# Patient Record
Sex: OTHER/UNKNOWN | Race: WHITE | Employment: FULL TIME | URBAN - METROPOLITAN AREA
[De-identification: names, ages, dates, MRNs, and addresses within clinical notes are randomized per-mention and may not be internally consistent; named-entity substitution may affect disease eponyms.]

---

## 2024-04-02 ENCOUNTER — OFFICE VISIT (OUTPATIENT)
Dept: PRIMARY CARE | Facility: CLINIC | Age: 49
End: 2024-04-02
Payer: COMMERCIAL

## 2024-04-02 VITALS
BODY MASS INDEX: 35.16 KG/M2 | DIASTOLIC BLOOD PRESSURE: 90 MMHG | HEIGHT: 68 IN | WEIGHT: 232 LBS | TEMPERATURE: 96.8 F | OXYGEN SATURATION: 98 % | HEART RATE: 67 BPM | SYSTOLIC BLOOD PRESSURE: 142 MMHG

## 2024-04-02 DIAGNOSIS — R12 HEARTBURN: Primary | ICD-10-CM

## 2024-04-02 DIAGNOSIS — R00.2 PALPITATIONS: ICD-10-CM

## 2024-04-02 PROCEDURE — 1036F TOBACCO NON-USER: CPT | Performed by: FAMILY MEDICINE

## 2024-04-02 PROCEDURE — 99203 OFFICE O/P NEW LOW 30 MIN: CPT | Performed by: FAMILY MEDICINE

## 2024-04-02 RX ORDER — PANTOPRAZOLE SODIUM 20 MG/1
TABLET, DELAYED RELEASE ORAL
COMMUNITY
Start: 2024-03-22 | End: 2024-04-02 | Stop reason: SDUPTHER

## 2024-04-02 RX ORDER — PANTOPRAZOLE SODIUM 40 MG/1
40 TABLET, DELAYED RELEASE ORAL
Qty: 30 TABLET | Refills: 0 | Status: SHIPPED | OUTPATIENT
Start: 2024-04-02 | End: 2024-05-20 | Stop reason: SDUPTHER

## 2024-04-02 NOTE — PROGRESS NOTES
Subjective   Patient ID: Lul Lizama is a 48 y.o. adult who presents for Follow-up and New Patient Visit.    Assessment/Plan     Problem List Items Addressed This Visit       Heartburn - Primary    Relevant Medications    pantoprazole (ProtoNix) 40 mg EC tablet    Palpitations    Relevant Orders    TSH with reflex to Free T4 if abnormal    Comprehensive Metabolic Panel       HPI    48-year-old male new patient to me    Paroxysmal A-fib- just one episode - otherwise PVCs  WILLIAM s/p inspire device placement in 2020 0- did not work - on cpap  Hyperlipidemia  Obesity BMI 35    Was Following with sleep medicine and cardiology  Was On aspirin and metoprolol    No smoking - quit 10 yrs ago - smoked 0.5  ppd for 10 yrs    Recent ER visit couple of weeks ago with palpitation  Patient had an EKG done showed PVCs      Minimally elevated LFT and creatinine 1.4 will repeat CMP in couple of weeks  Chest x-ray within normal limits  Patient also complaining of heartburn burping was prescribed pantoprazole  Will increase the dose and advised patient to take daily  Avoid alcohol  Patient does not smoke  No over-the-counter anti-inflammatories no excessive caffeine    If symptoms return will consider right upper quadrant ultrasound and possibly GI consult for EGD  Patient agrees    Patient states he had a heart monitor few years ago for a month and found to have only PVCs just 1 episode of A-fib    Patient will be seen by cardiology will await for recommendation  Currently asymptomatic    No Known Allergies    Current Outpatient Medications   Medication Sig Dispense Refill    pantoprazole (ProtoNix) 40 mg EC tablet Take 1 tablet (40 mg) by mouth once daily in the morning. Take before meals. Do not crush, chew, or split. 30 tablet 0     No current facility-administered medications for this visit.       Objective   Visit Vitals  /90 (BP Location: Left arm, Patient Position: Sitting)   Pulse 67   Temp 36 °C (96.8 °F)   Ht 1.727 m  "(5' 8\")   Wt 105 kg (232 lb)   SpO2 98%   BMI 35.28 kg/m²   Smoking Status Former   BSA 2.24 m²     Physical Exam  Constitutional:       General: He is not in acute distress.     Appearance: Normal appearance.   HENT:      Head: Normocephalic and atraumatic.      Nose: Nose normal.   Eyes:      Extraocular Movements: Extraocular movements intact.      Conjunctiva/sclera: Conjunctivae normal.   Cardiovascular:      Rate and Rhythm: Normal rate and regular rhythm.   Pulmonary:      Effort: Pulmonary effort is normal.      Breath sounds: Normal breath sounds.   Skin:     General: Skin is warm.   Neurological:      Mental Status: He is alert and oriented to person, place, and time.   Psychiatric:         Mood and Affect: Mood normal.         Behavior: Behavior normal.     No carotid or thyroid bruit or thyromegaly    There is no immunization history on file for this patient.    Review of Systems    No visits with results within 4 Month(s) from this visit.   Latest known visit with results is:   No results found for any previous visit.       Radiology: Reviewed imaging in powerchart.  No results found.    No family history on file.  Social History     Socioeconomic History    Marital status:      Spouse name: None    Number of children: None    Years of education: None    Highest education level: None   Occupational History    None   Tobacco Use    Smoking status: Former     Packs/day: 0.50     Years: 10.00     Additional pack years: 0.00     Total pack years: 5.00     Types: Cigarettes    Smokeless tobacco: Former     Types: Chew   Substance and Sexual Activity    Alcohol use: Yes     Alcohol/week: 2.0 standard drinks of alcohol     Types: 2 Standard drinks or equivalent per week    Drug use: Never    Sexual activity: None   Other Topics Concern    None   Social History Narrative    None     Social Determinants of Health     Financial Resource Strain: Not on file   Food Insecurity: Not on file   Transportation " Needs: Not on file   Physical Activity: Not on file   Stress: Not on file   Social Connections: Not on file   Intimate Partner Violence: Not on file   Housing Stability: Not on file     History reviewed. No pertinent past medical history.  History reviewed. No pertinent surgical history.    Charting was completed using voice recognition technology and may include unintended errors.

## 2024-05-17 ENCOUNTER — TELEPHONE (OUTPATIENT)
Dept: PRIMARY CARE | Facility: CLINIC | Age: 49
End: 2024-05-17
Payer: COMMERCIAL

## 2024-05-17 DIAGNOSIS — R12 HEARTBURN: ICD-10-CM

## 2024-05-20 RX ORDER — PANTOPRAZOLE SODIUM 40 MG/1
40 TABLET, DELAYED RELEASE ORAL
Qty: 30 TABLET | Refills: 0 | Status: SHIPPED | OUTPATIENT
Start: 2024-05-20

## 2024-10-11 ENCOUNTER — HOSPITAL ENCOUNTER (INPATIENT)
Facility: HOSPITAL | Age: 49
DRG: 093 | End: 2024-10-11
Attending: THORACIC SURGERY (CARDIOTHORACIC VASCULAR SURGERY) | Admitting: THORACIC SURGERY (CARDIOTHORACIC VASCULAR SURGERY)
Payer: COMMERCIAL

## 2024-10-11 DIAGNOSIS — G89.18 POST-OP PAIN: ICD-10-CM

## 2024-10-11 DIAGNOSIS — L08.9 SOFT TISSUE INFECTION: Primary | ICD-10-CM

## 2024-10-11 DIAGNOSIS — J02.9 THROAT INFECTION: ICD-10-CM

## 2024-10-11 LAB
APTT PPP: 32 SECONDS (ref 27–38)
ERYTHROCYTE [DISTWIDTH] IN BLOOD BY AUTOMATED COUNT: 12.4 % (ref 11.5–14.5)
HCT VFR BLD AUTO: 44.9 % (ref 36–52)
HGB BLD-MCNC: 15.3 G/DL (ref 12–17.5)
INR PPP: 1 (ref 0.9–1.1)
MCH RBC QN AUTO: 29.6 PG (ref 26–34)
MCHC RBC AUTO-ENTMCNC: 34.1 G/DL (ref 32–36)
MCV RBC AUTO: 87 FL (ref 80–100)
NRBC BLD-RTO: 0 /100 WBCS (ref 0–0)
PLATELET # BLD AUTO: 165 X10*3/UL (ref 150–450)
PROTHROMBIN TIME: 11.4 SECONDS (ref 9.8–12.8)
RBC # BLD AUTO: 5.17 X10*6/UL (ref 4–5.9)
WBC # BLD AUTO: 5 X10*3/UL (ref 4.4–11.3)

## 2024-10-11 PROCEDURE — 85610 PROTHROMBIN TIME: CPT

## 2024-10-11 PROCEDURE — 80048 BASIC METABOLIC PNL TOTAL CA: CPT

## 2024-10-11 PROCEDURE — 83735 ASSAY OF MAGNESIUM: CPT

## 2024-10-11 PROCEDURE — 85027 COMPLETE CBC AUTOMATED: CPT

## 2024-10-11 PROCEDURE — 36415 COLL VENOUS BLD VENIPUNCTURE: CPT

## 2024-10-11 PROCEDURE — 86901 BLOOD TYPING SEROLOGIC RH(D): CPT

## 2024-10-11 PROCEDURE — 2500000004 HC RX 250 GENERAL PHARMACY W/ HCPCS (ALT 636 FOR OP/ED)

## 2024-10-11 PROCEDURE — 1200000002 HC GENERAL ROOM WITH TELEMETRY DAILY

## 2024-10-11 PROCEDURE — 2500000001 HC RX 250 WO HCPCS SELF ADMINISTERED DRUGS (ALT 637 FOR MEDICARE OP)

## 2024-10-11 PROCEDURE — 99223 1ST HOSP IP/OBS HIGH 75: CPT | Performed by: THORACIC SURGERY (CARDIOTHORACIC VASCULAR SURGERY)

## 2024-10-11 RX ORDER — NALOXONE HYDROCHLORIDE 0.4 MG/ML
0.2 INJECTION, SOLUTION INTRAMUSCULAR; INTRAVENOUS; SUBCUTANEOUS EVERY 5 MIN PRN
Status: DISCONTINUED | OUTPATIENT
Start: 2024-10-11 | End: 2024-10-17 | Stop reason: HOSPADM

## 2024-10-11 RX ORDER — ACETAMINOPHEN 325 MG/1
650 TABLET ORAL EVERY 6 HOURS
Status: DISCONTINUED | OUTPATIENT
Start: 2024-10-11 | End: 2024-10-17 | Stop reason: HOSPADM

## 2024-10-11 RX ORDER — OXYCODONE HYDROCHLORIDE 5 MG/1
10 TABLET ORAL EVERY 4 HOURS PRN
Status: DISCONTINUED | OUTPATIENT
Start: 2024-10-11 | End: 2024-10-17 | Stop reason: HOSPADM

## 2024-10-11 RX ORDER — VANCOMYCIN HYDROCHLORIDE 1 G/200ML
1000 INJECTION, SOLUTION INTRAVENOUS ONCE
Status: COMPLETED | OUTPATIENT
Start: 2024-10-11 | End: 2024-10-12

## 2024-10-11 RX ORDER — VANCOMYCIN HYDROCHLORIDE 500 MG/100ML
500 INJECTION, SOLUTION INTRAVENOUS ONCE
Status: COMPLETED | OUTPATIENT
Start: 2024-10-12 | End: 2024-10-12

## 2024-10-11 RX ORDER — VANCOMYCIN HYDROCHLORIDE 1 G/20ML
INJECTION, POWDER, LYOPHILIZED, FOR SOLUTION INTRAVENOUS DAILY PRN
Status: DISCONTINUED | OUTPATIENT
Start: 2024-10-11 | End: 2024-10-17 | Stop reason: HOSPADM

## 2024-10-11 RX ORDER — ONDANSETRON HYDROCHLORIDE 2 MG/ML
4 INJECTION, SOLUTION INTRAVENOUS EVERY 8 HOURS PRN
Status: DISCONTINUED | OUTPATIENT
Start: 2024-10-11 | End: 2024-10-11

## 2024-10-11 RX ORDER — OXYCODONE HYDROCHLORIDE 5 MG/1
5 TABLET ORAL EVERY 6 HOURS PRN
Status: DISCONTINUED | OUTPATIENT
Start: 2024-10-11 | End: 2024-10-13

## 2024-10-11 ASSESSMENT — PAIN DESCRIPTION - ORIENTATION: ORIENTATION: RIGHT

## 2024-10-11 ASSESSMENT — PAIN DESCRIPTION - LOCATION: LOCATION: CHEST

## 2024-10-11 ASSESSMENT — PAIN - FUNCTIONAL ASSESSMENT
PAIN_FUNCTIONAL_ASSESSMENT: 0-10
PAIN_FUNCTIONAL_ASSESSMENT: 0-10

## 2024-10-11 ASSESSMENT — PAIN SCALES - GENERAL
PAINLEVEL_OUTOF10: 3
PAINLEVEL_OUTOF10: 7

## 2024-10-11 ASSESSMENT — PAIN DESCRIPTION - DESCRIPTORS: DESCRIPTORS: ACHING;STABBING

## 2024-10-11 NOTE — H&P
History Of Present Illness  Lul Lizama is a 49 y.o. adult with a history of WILLIAM presenting as a transfer for management of possible infection of his hypoglossal nerve stimulator.     Patient initially had stimulator placed in 2020 at [facility] for management of WILLIAM. He says it has not worked for him as effectively as CPAP over the last 2 years. Has never had issues with it until yesterday, when he noticed redness and marked pain at the insertion site in the R chest wall. He denies any associated dysphagia, N/V, fever, chills, constipation, diarrhea. Has never had a wound like this before. Denies smoking.    All systems reviewed and otherwise negative.     Past Medical History  HLD, obesity, WILLIAM, pAF (not on medications)    Surgical History  Insertion of inspire upper airway neurostimulator for sleep apnea (3/2020)     Social History  Lul reports that Lul has quit smoking. Lul's smoking use included cigarettes. Lul has a 5 pack-year smoking history. Lul has quit using smokeless tobacco.  Lul's smokeless tobacco use included chew. Lul reports current alcohol use of about 2.0 standard drinks of alcohol per week. Lul reports that Lul does not use drugs.    Family History  No family history on file.     Allergies  Patient has no known allergies.    General: NAD, resting comfortably  HEENT: NCAT  Resp: nonlabored breathing on RA  CV: RRR  Abd: soft, NTND  MSK: moves all extremities  Ext: no LE edema  Skin: blanching erythema, edema, and cellulitis over stimulator site on R chest wall - exquisitely TTP  Psych: appropriate mood and behavior     Last Recorded Vitals  Blood pressure (!) 190/110, pulse 66, temperature 36.4 °C (97.5 °F), temperature source Temporal, resp. rate 20, weight 98.1 kg (216 lb 3.2 oz), SpO2 99%.    Relevant Results  Labs and imaging pending.     Assessment/Plan   50 y/o M with history of WILLIAM presenting as a transfer for management of possible infection of his hypoglossal nerve  stimulator.     Plan:  - ENT consult  - Ok for CLD per ENT  - CT neck and chest with IV contrast   - Follow up labs  - Empiric vanc/zosyn    To be discussed with attending, Dr. Momin.    Cailtyn Sosa MD  Thoracic Surgery [id number]

## 2024-10-12 ENCOUNTER — APPOINTMENT (OUTPATIENT)
Dept: RADIOLOGY | Facility: HOSPITAL | Age: 49
End: 2024-10-12
Payer: COMMERCIAL

## 2024-10-12 LAB
ABO GROUP (TYPE) IN BLOOD: NORMAL
ANION GAP SERPL CALC-SCNC: 13 MMOL/L (ref 10–20)
ANTIBODY SCREEN: NORMAL
BUN SERPL-MCNC: 29 MG/DL (ref 6–23)
CALCIUM SERPL-MCNC: 9.2 MG/DL (ref 8.6–10.6)
CHLORIDE SERPL-SCNC: 104 MMOL/L (ref 98–107)
CO2 SERPL-SCNC: 26 MMOL/L (ref 21–32)
CREAT SERPL-MCNC: 1.3 MG/DL (ref 0.5–1.3)
EGFRCR SERPLBLD CKD-EPI 2021: 51 ML/MIN/1.73M*2
GLUCOSE SERPL-MCNC: 120 MG/DL (ref 74–99)
MAGNESIUM SERPL-MCNC: 2.11 MG/DL (ref 1.6–2.4)
POTASSIUM SERPL-SCNC: 3.7 MMOL/L (ref 3.5–5.3)
RH FACTOR (ANTIGEN D): NORMAL
SODIUM SERPL-SCNC: 139 MMOL/L (ref 136–145)

## 2024-10-12 PROCEDURE — 99222 1ST HOSP IP/OBS MODERATE 55: CPT | Performed by: OTOLARYNGOLOGY

## 2024-10-12 PROCEDURE — 2500000001 HC RX 250 WO HCPCS SELF ADMINISTERED DRUGS (ALT 637 FOR MEDICARE OP)

## 2024-10-12 PROCEDURE — 99233 SBSQ HOSP IP/OBS HIGH 50: CPT | Performed by: THORACIC SURGERY (CARDIOTHORACIC VASCULAR SURGERY)

## 2024-10-12 PROCEDURE — 2500000004 HC RX 250 GENERAL PHARMACY W/ HCPCS (ALT 636 FOR OP/ED)

## 2024-10-12 PROCEDURE — 70491 CT SOFT TISSUE NECK W/DYE: CPT | Performed by: RADIOLOGY

## 2024-10-12 PROCEDURE — 71260 CT THORAX DX C+: CPT

## 2024-10-12 PROCEDURE — 70491 CT SOFT TISSUE NECK W/DYE: CPT

## 2024-10-12 PROCEDURE — 1200000003 HC ONCOLOGY  ROOM WITH TELEMETRY DAILY

## 2024-10-12 PROCEDURE — 2550000001 HC RX 255 CONTRASTS: Performed by: THORACIC SURGERY (CARDIOTHORACIC VASCULAR SURGERY)

## 2024-10-12 PROCEDURE — 71260 CT THORAX DX C+: CPT | Performed by: STUDENT IN AN ORGANIZED HEALTH CARE EDUCATION/TRAINING PROGRAM

## 2024-10-12 RX ORDER — ACETAMINOPHEN 500 MG
1 TABLET ORAL DAILY
COMMUNITY

## 2024-10-12 RX ORDER — VANCOMYCIN HYDROCHLORIDE 1 G/200ML
1000 INJECTION, SOLUTION INTRAVENOUS EVERY 12 HOURS
Status: DISCONTINUED | OUTPATIENT
Start: 2024-10-12 | End: 2024-10-12

## 2024-10-12 ASSESSMENT — PAIN - FUNCTIONAL ASSESSMENT
PAIN_FUNCTIONAL_ASSESSMENT: 0-10
PAIN_FUNCTIONAL_ASSESSMENT: WONG-BAKER FACES

## 2024-10-12 ASSESSMENT — PAIN SCALES - GENERAL
PAINLEVEL_OUTOF10: 7
PAINLEVEL_OUTOF10: 3
PAINLEVEL_OUTOF10: 3
PAINLEVEL_OUTOF10: 8
PAINLEVEL_OUTOF10: 7
PAINLEVEL_OUTOF10: 0 - NO PAIN
PAINLEVEL_OUTOF10: 7

## 2024-10-12 ASSESSMENT — PAIN DESCRIPTION - ORIENTATION: ORIENTATION: RIGHT

## 2024-10-12 ASSESSMENT — PAIN DESCRIPTION - LOCATION: LOCATION: CHEST

## 2024-10-12 ASSESSMENT — PAIN DESCRIPTION - DESCRIPTORS
DESCRIPTORS: ACHING
DESCRIPTORS: SORE;STABBING

## 2024-10-12 NOTE — CONSULTS
"Vancomycin Dosing by Pharmacy- INITIAL    Lul Lizama is a 49 y.o. year old adult who Pharmacy has been consulted for vancomycin dosing for cellulitis, skin and soft tissue. Based on the patient's indication and renal status this patient will be dosed based on a goal AUC of 400-600    Renal function is stable    Visit Vitals  BP (!) 190/110 (BP Location: Left arm, Patient Position: Lying)   Pulse 66   Temp 36.4 °C (97.5 °F) (Temporal)   Resp 20        No results found for: \"CREATININE\"     Patient weight is as follows:   Vitals:    10/11/24 2152   Weight: 98.1 kg (216 lb 3.2 oz)       Cultures:  No results found for the encounter in last 14 days.        No intake/output data recorded.  I/O during current shift:  No intake/output data recorded.    Temp (24hrs), Av.4 °C (97.5 °F), Min:36.4 °C (97.5 °F), Max:36.4 °C (97.5 °F)         Assessment/Plan     Patient will not be given a loading dose.  Will initiate vancomycin 1500 mg x1 dose and will order maintenance dose pending results of renal labs  Scheduled 1g q12H starting at 1200    insightRX Pharmacokinetic parameters:  Regimen: 1000 mg IV every 12 hours.  Start time: 12:00 on 10/12/2024  Exposure target: AUC24 (range)400-600 mg/L.hr   ELH86-33: 462 mg/L.hr  AUC24,ss: 521 mg/L.hr  Probability of AUC24 > 400: 77 %  Ctrough,ss: 17.3 mg/L  Probability of Ctrough,ss > 20: 37 %      Follow-up level will be ordered on 1013 AM labs unless clinically indicated sooner.  Will continue to monitor renal function daily while on vancomycin and order serum creatinine at least every 48 hours if not already ordered.  Follow for continued vancomycin needs, clinical response, and signs/symptoms of toxicity.       Devin Santo, PharmD       "

## 2024-10-12 NOTE — H&P (VIEW-ONLY)
ENT DEPARTMENT CONSULTATION NOTE  Name: Lul Lizama  MRN: [id number]  : 1975  Consulting Team: Thoracic surgery, Dr. Ayon   Reason for Consult: Infection of inspire implant site    History of Present Illness  The patient is a 49 y.o. adult who presented to [facility] on 10/11/2024 for infection of his hypoglossal nerve stimulator chest site.  Patient notes that he received the implant in  by Dr. Duval but has not really been using it as it never worked.  Patient denies any symptoms from the area until about 2 months ago when he started noticing that it was protruding a little bit more.  Over the past day, the patient noted that it has gotten red and acutely painful especially palpation.  Patient denies any fevers chills nausea vomiting abdominal pain chest pain shortness of breath or any other constitutional symptoms.  Patient went to [facility] And was subsequently transferred to [facility] for further evaluation.  Patient was accepted to the thoracic surgery service and ENT was consulted for likely postsurgical infection.        Review of Systems  14 point review of systems completed and all negative except as noted in HPI.    Past Medical History  No past medical history on file.    Past Surgical History  No past surgical history on file.    Allergies  No Known Allergies    Medications    Current Facility-Administered Medications:     acetaminophen (Tylenol) tablet 650 mg, 650 mg, oral, q6h, Caitlyn Sosa MD, 650 mg at 10/12/24 0805    naloxone (Narcan) injection 0.2 mg, 0.2 mg, intravenous, q5 min PRN, Caitlyn Sosa MD    oxyCODONE (Roxicodone) immediate release tablet 10 mg, 10 mg, oral, q4h PRN, Caitlyn Sosa MD, 10 mg at 10/12/24 0806    oxyCODONE (Roxicodone) immediate release tablet 5 mg, 5 mg, oral, q6h PRN, Caitlyn Sosa MD    piperacillin-tazobactam (Zosyn) 3.375 g in dextrose (iso) IV 50 mL, 3.375 g, intravenous, q6h, Caitlyn Sosa MD, Stopped at 10/12/24 0600    vancomycin (Vancocin) 1,000 mg  in dextrose 5%  mL, 1,000 mg, intravenous, q12h, Caitlyn Sosa MD    vancomycin (Vancocin) pharmacy to dose - pharmacy monitoring, , miscellaneous, Daily PRN, Caitlyn Sosa MD    Family History  No family history on file.    Social History  Social History     Socioeconomic History    Marital status:      Spouse name: Not on file    Number of children: Not on file    Years of education: Not on file    Highest education level: Not on file   Occupational History    Not on file   Tobacco Use    Smoking status: Former     Current packs/day: 0.50     Average packs/day: 0.5 packs/day for 10.0 years (5.0 ttl pk-yrs)     Types: Cigarettes    Smokeless tobacco: Former     Types: Chew   Substance and Sexual Activity    Alcohol use: Yes     Alcohol/week: 2.0 standard drinks of alcohol     Types: 2 Standard drinks or equivalent per week    Drug use: Never    Sexual activity: Not on file   Other Topics Concern    Not on file   Social History Narrative    Not on file     Social Determinants of Health     Financial Resource Strain: Not on file   Food Insecurity: Not on file   Transportation Needs: Not on file   Physical Activity: Not on file   Stress: Not on file   Social Connections: Not on file   Intimate Partner Violence: Not on file   Housing Stability: Not on file       Vital Signs  Vitals:    10/12/24 0500   BP: 132/76   Pulse: (!) 49   Resp: 16   Temp: 36.7 °C (98.1 °F)   SpO2: 97%       Physical Examination  GEN: The patient appears stated age in no acute distress  VOICE: No dysphonia, volume is appropriate, no pitch/voice breaks   RESP: Unlabored on room air with no audible stertor or stridor  CV: Clinically well perfused   EYES: EOM grossly intact with no scleral icterus  NEURO: Alert and oriented with no focal deficits and CN II-XII symmetric and intact bilaterally  HEAD: Scalp is normocephalic and atraumatic  FACE: No abrasions or lacerations, no maxillary or mandibular stepoffs  EARS: Normal external  ears  NOSE: External nose appears normal  OC: Normal lips, normal buccal mucosa, normal alveolar ridge, normal floor of mouth, normal tongue, normal hard palate, normal retromolar trigone  OP: normal pharyngeal walls, normal soft palate  NECK: Trachea midline, no significant lymphadenopathy  Chest: Well healed scar over right chest, area approximately 3x4cm erythematous with surrounding induration and soft central area but without any evidence of fluctuance.  Site is exquisitely tender to palpation.  No evidence of wound breakdown.    Laboratory and Data  Results for orders placed or performed during the hospital encounter of 10/11/24 (from the past 24 hour(s))   CBC   Result Value Ref Range    WBC 5.0 4.4 - 11.3 x10*3/uL    nRBC 0.0 0.0 - 0.0 /100 WBCs    RBC 5.17 4.00 - 5.90 x10*6/uL    Hemoglobin 15.3 12.0 - 17.5 g/dL    Hematocrit 44.9 36.0 - 52.0 %    MCV 87 80 - 100 fL    MCH 29.6 26.0 - 34.0 pg    MCHC 34.1 32.0 - 36.0 g/dL    RDW 12.4 11.5 - 14.5 %    Platelets 165 150 - 450 x10*3/uL   Coagulation Screen   Result Value Ref Range    Protime 11.4 9.8 - 12.8 seconds    INR 1.0 0.9 - 1.1    aPTT 32 27 - 38 seconds   Basic metabolic panel   Result Value Ref Range    Glucose 120 (H) 74 - 99 mg/dL    Sodium 139 136 - 145 mmol/L    Potassium 3.7 3.5 - 5.3 mmol/L    Chloride 104 98 - 107 mmol/L    Bicarbonate 26 21 - 32 mmol/L    Anion Gap 13 10 - 20 mmol/L    Urea Nitrogen 29 (H) 6 - 23 mg/dL    Creatinine 1.30 0.50 - 1.30 mg/dL    eGFR 51 (L) >60 mL/min/1.73m*2    Calcium 9.2 8.6 - 10.6 mg/dL   Magnesium   Result Value Ref Range    Magnesium 2.11 1.60 - 2.40 mg/dL   Type And Screen   Result Value Ref Range    ABO TYPE O     Rh TYPE POS     ANTIBODY SCREEN NEG        Radiology Reviewed  I have personally reviewed the CT neck and chest. No clear fluid collection, lots of scatter.      Assessment  Lul Lizama is a 49 y.o. adult who underwent hypoglossal nerve stimulation placement 2020 now presenting with acute  erythema and pain of his right chest incision and implant site.  Patient has no constitutional symptoms.  CT imaging demonstrates no clear fluid collection. On exam, no obvious abscess or well defined collection of fluid at this time. Patient has not had antibiotics until the last 24 hours.    Recommendations  -Patient may eat, no indication for procedure at this time.   -Initiate broad-spectrum antibiotics - recommend at least 48 hours of abx before we would reassess any intervention plans unless erythema extends or patient clinical condition worsens  -IF the patient does develop any concern for abscess formation would consider diagnostic needle I&D at bedside prior to any further intervention    Papito Cottrell MD - PGY2  Otolaryngology - Head & Neck Surgery  [facility]    I am the night resident. I can only be contacted from 5 PM to 6 AM. Page on weekends or off hours.   ENT Consult pager: [phone]  ENT Peds pager: [phone]  ENT Head & Neck Surgery Phone: [phone]  ENT subspecialty team: Loc individual resident who wrote today's note  ENT Outpatient scheduling number: [phone]  Please Page [phone] or call [phone] if Urgent      _______________________________________________________________________________________  STAFFING UPDATE:    Seen by Dr. Landon. Minimal erythema, induration, no denisse abscess appreciated on physical exam. Rec 48 hrs of abx. Will continue to observe, if worsening will consider further discussion with sleep surgery attendings. Okay for transfer to ENT service under Dr. Landon.    Attending attestation: I, Rubén Landon MD FACS, reviewed the note and performed a complete history and physical on the patient and examined the patient at the bedside. I agree with the note as outlined with changes made by myself directly to this note.

## 2024-10-12 NOTE — PROGRESS NOTES
"Pharmacy Medication History Review    Lul Lizama is a 49 y.o. adult admitted for Soft tissue infection. Pharmacy reviewed the patient's [id number] medications and allergies for accuracy.    Medications ADDED:  Vitamin D3 125mg  Medications CHANGED:  none  Medications REMOVED:   none     The list below reflects the updated PTA list.   Prior to Admission Medications   Prescriptions Last Dose Informant   cholecalciferol (Vitamin D3) 5,000 Units tablet  Self   Sig: Take 1 tablet (5,000 Units) by mouth once daily.   pantoprazole (ProtoNix) 40 mg EC tablet More than a month Self   Sig: Take 1 tablet (40 mg) by mouth once daily in the morning. Take before meals. Do not crush, chew, or split.   Patient not taking: Reported on 10/12/2024      Facility-Administered Medications: None        The list below reflects the updated allergy list. Please review each documented allergy for additional clarification and justification.  Allergies  Reviewed by Caitlyn Sosa MD on 10/11/2024   No Known Allergies         Patient declines M2B at discharge.     Sources:   Good historian. Patient interviewed at bedside, confirmed medication history  Review of out patient fill history, OARRS     Additional Comments:  none      Kayden Payne, PharmD  Transitions of Care Pharmacist  10/12/24     Secure Chat preferred   If no response call [phone] or [place] \"Med Rec\"    "

## 2024-10-12 NOTE — CONSULTS
ENT DEPARTMENT CONSULTATION NOTE  Name: Lul Lizama  MRN: [id number]  : 1975  Consulting Team: Thoracic surgery, Dr. Ayon   Reason for Consult: Infection of inspire implant site    History of Present Illness  The patient is a 49 y.o. adult who presented to [facility] on 10/11/2024 for infection of his hypoglossal nerve stimulator chest site.  Patient notes that he received the implant in  by Dr. Duval but has not really been using it as it never worked.  Patient denies any symptoms from the area until about 2 months ago when he started noticing that it was protruding a little bit more.  Over the past day, the patient noted that it has gotten red and acutely painful especially palpation.  Patient denies any fevers chills nausea vomiting abdominal pain chest pain shortness of breath or any other constitutional symptoms.  Patient went to [facility] And was subsequently transferred to [facility] for further evaluation.  Patient was accepted to the thoracic surgery service and ENT was consulted for likely postsurgical infection.        Review of Systems  14 point review of systems completed and all negative except as noted in HPI.    Past Medical History  No past medical history on file.    Past Surgical History  No past surgical history on file.    Allergies  No Known Allergies    Medications    Current Facility-Administered Medications:     acetaminophen (Tylenol) tablet 650 mg, 650 mg, oral, q6h, Caitlyn Sosa MD, 650 mg at 10/12/24 0805    naloxone (Narcan) injection 0.2 mg, 0.2 mg, intravenous, q5 min PRN, Caitlyn Sosa MD    oxyCODONE (Roxicodone) immediate release tablet 10 mg, 10 mg, oral, q4h PRN, Caitlyn Sosa MD, 10 mg at 10/12/24 0806    oxyCODONE (Roxicodone) immediate release tablet 5 mg, 5 mg, oral, q6h PRN, Caitlyn Sosa MD    piperacillin-tazobactam (Zosyn) 3.375 g in dextrose (iso) IV 50 mL, 3.375 g, intravenous, q6h, Caitlyn Sosa MD, Stopped at 10/12/24 0600    vancomycin (Vancocin) 1,000 mg  in dextrose 5%  mL, 1,000 mg, intravenous, q12h, Caitlyn Sosa MD    vancomycin (Vancocin) pharmacy to dose - pharmacy monitoring, , miscellaneous, Daily PRN, Caitlyn Sosa MD    Family History  No family history on file.    Social History  Social History     Socioeconomic History    Marital status:      Spouse name: Not on file    Number of children: Not on file    Years of education: Not on file    Highest education level: Not on file   Occupational History    Not on file   Tobacco Use    Smoking status: Former     Current packs/day: 0.50     Average packs/day: 0.5 packs/day for 10.0 years (5.0 ttl pk-yrs)     Types: Cigarettes    Smokeless tobacco: Former     Types: Chew   Substance and Sexual Activity    Alcohol use: Yes     Alcohol/week: 2.0 standard drinks of alcohol     Types: 2 Standard drinks or equivalent per week    Drug use: Never    Sexual activity: Not on file   Other Topics Concern    Not on file   Social History Narrative    Not on file     Social Determinants of Health     Financial Resource Strain: Not on file   Food Insecurity: Not on file   Transportation Needs: Not on file   Physical Activity: Not on file   Stress: Not on file   Social Connections: Not on file   Intimate Partner Violence: Not on file   Housing Stability: Not on file       Vital Signs  Vitals:    10/12/24 0500   BP: 132/76   Pulse: (!) 49   Resp: 16   Temp: 36.7 °C (98.1 °F)   SpO2: 97%       Physical Examination  GEN: The patient appears stated age in no acute distress  VOICE: No dysphonia, volume is appropriate, no pitch/voice breaks   RESP: Unlabored on room air with no audible stertor or stridor  CV: Clinically well perfused   EYES: EOM grossly intact with no scleral icterus  NEURO: Alert and oriented with no focal deficits and CN II-XII symmetric and intact bilaterally  HEAD: Scalp is normocephalic and atraumatic  FACE: No abrasions or lacerations, no maxillary or mandibular stepoffs  EARS: Normal external  ears  NOSE: External nose appears normal  OC: Normal lips, normal buccal mucosa, normal alveolar ridge, normal floor of mouth, normal tongue, normal hard palate, normal retromolar trigone  OP: normal pharyngeal walls, normal soft palate  NECK: Trachea midline, no significant lymphadenopathy  Chest: Well healed scar over right chest, area approximately 3x4cm erythematous with surrounding induration and soft central area but without any evidence of fluctuance.  Site is exquisitely tender to palpation.  No evidence of wound breakdown.    Laboratory and Data  Results for orders placed or performed during the hospital encounter of 10/11/24 (from the past 24 hour(s))   CBC   Result Value Ref Range    WBC 5.0 4.4 - 11.3 x10*3/uL    nRBC 0.0 0.0 - 0.0 /100 WBCs    RBC 5.17 4.00 - 5.90 x10*6/uL    Hemoglobin 15.3 12.0 - 17.5 g/dL    Hematocrit 44.9 36.0 - 52.0 %    MCV 87 80 - 100 fL    MCH 29.6 26.0 - 34.0 pg    MCHC 34.1 32.0 - 36.0 g/dL    RDW 12.4 11.5 - 14.5 %    Platelets 165 150 - 450 x10*3/uL   Coagulation Screen   Result Value Ref Range    Protime 11.4 9.8 - 12.8 seconds    INR 1.0 0.9 - 1.1    aPTT 32 27 - 38 seconds   Basic metabolic panel   Result Value Ref Range    Glucose 120 (H) 74 - 99 mg/dL    Sodium 139 136 - 145 mmol/L    Potassium 3.7 3.5 - 5.3 mmol/L    Chloride 104 98 - 107 mmol/L    Bicarbonate 26 21 - 32 mmol/L    Anion Gap 13 10 - 20 mmol/L    Urea Nitrogen 29 (H) 6 - 23 mg/dL    Creatinine 1.30 0.50 - 1.30 mg/dL    eGFR 51 (L) >60 mL/min/1.73m*2    Calcium 9.2 8.6 - 10.6 mg/dL   Magnesium   Result Value Ref Range    Magnesium 2.11 1.60 - 2.40 mg/dL   Type And Screen   Result Value Ref Range    ABO TYPE O     Rh TYPE POS     ANTIBODY SCREEN NEG        Radiology Reviewed  I have personally reviewed the CT neck and chest. No clear fluid collection, lots of scatter.      Assessment  Lul Lizama is a 49 y.o. adult who underwent hypoglossal nerve stimulation placement 2020 now presenting with acute  erythema and pain of his right chest incision and implant site.  Patient has no constitutional symptoms.  CT imaging demonstrates no clear fluid collection. On exam, no obvious abscess or well defined collection of fluid at this time. Patient has not had antibiotics until the last 24 hours.    Recommendations  -Patient may eat, no indication for procedure at this time.   -Initiate broad-spectrum antibiotics - recommend at least 48 hours of abx before we would reassess any intervention plans unless erythema extends or patient clinical condition worsens  -IF the patient does develop any concern for abscess formation would consider diagnostic needle I&D at bedside prior to any further intervention    Papito Cottrell MD - PGY2  Otolaryngology - Head & Neck Surgery  [facility]    I am the night resident. I can only be contacted from 5 PM to 6 AM. Page on weekends or off hours.   ENT Consult pager: [phone]  ENT Peds pager: [phone]  ENT Head & Neck Surgery Phone: [phone]  ENT subspecialty team: Loc individual resident who wrote today's note  ENT Outpatient scheduling number: [phone]  Please Page [phone] or call [phone] if Urgent      _______________________________________________________________________________________  STAFFING UPDATE:    Seen by Dr. Landon. Minimal erythema, induration, no denisse abscess appreciated on physical exam. Rec 48 hrs of abx. Will continue to observe, if worsening will consider further discussion with sleep surgery attendings. Okay for transfer to ENT service under Dr. Landon.    Attending attestation: I, Rubén Landon MD FACS, reviewed the note and performed a complete history and physical on the patient and examined the patient at the bedside. I agree with the note as outlined with changes made by myself directly to this note.

## 2024-10-12 NOTE — PROGRESS NOTES
Lul Lizama is a 49 y.o. adult on day 1 of admission presenting with Soft tissue infection.    Subjective   No acute events overnight. Patient tolerating clears. Pain is controlled.     CT chest and neck done overnight showing cellulitis and a questionable collection surrounding the battery of the hypoglossal nerve stimulator.     Objective     Physical Exam  General: NAD, resting comfortably  HEENT: NCAT  Resp: nonlabored breathing on RA  CV: RRR  Abd: soft, NTND  MSK: moves all extremities  Ext: no LE edema  Skin: blanching erythema, edema, and cellulitis over stimulator site on R chest wall - exquisitely TTP  Psych: appropriate mood and behavior  Last Recorded Vitals  Blood pressure 132/76, pulse (!) 49, temperature 36.7 °C (98.1 °F), temperature source Temporal, resp. rate 16, weight 98.1 kg (216 lb 3.2 oz), SpO2 97%.  Intake/Output last 3 Shifts:  I/O last 3 completed shifts:  In: 650 (6.6 mL/kg) [P.O.:250; IV Piggyback:400]  Out: - (0 mL/kg)   Weight: 98.1 kg     Relevant Results               CT soft tissue neck w IV contrast    Result Date: 10/12/2024  STUDY: CT SOFT TISSUE NECK W IV CONTRAST;  10/12/2024 3:13 am   INDICATION: Signs/Symptoms:concern for infected hypoglossal nerve stimulator, needs contrast.   COMPARISON: None.   ACCESSION NUMBER(S): [id number]   ORDERING CLINICIAN: VJ THORNE   TECHNIQUE: Axial CT images of the neck were obtained after the intravenous administration of 75 mL Omnipaque 350 contrast. Coronal and sagittal reformatted images were reconstructed from the axial data.   FINDINGS: Oral Cavity, Pharynx and Larynx:  Evaluation of the oral cavity is limited due to beam hardening artifact by dental hardware, however otherwise the oral cavity is unremarkable. The nasopharyngeal and oropharyngeal structures are unremarkable. The hypopharyngeal and laryngeal structures are unremarkable.   Retropharyngeal space and soft tissues: No retropharyngeal soft tissue thickening.    There is a partially visualized battery pack of the right chest wall with an associated lead for a right hypoglossal nerve stimulator. Ill-defined soft tissue thickening, skin thickening, and questionable collection overlying the battery pack. No additional inflammatory change or collection along the course of the lead.   Lymph nodes: No cervical lymphadenopathy.   Neck vessels: The vasculature of the neck is of normal course and caliber without significant calcified atherosclerosis or stenosis.   Thyroid gland: The thyroid gland is unremarkable in size and appearance.   Parotid and submandibular glands: Bilateral parotid and submandibular glands are unremarkable in appearance.   Paranasal Sinuses and Mastoids: Visualized paranasal sinuses and bilateral mastoids are clear.   Visualized orbital structures are unremarkable.   Visualized upper lungs are clear.   Visualized cervical spine appears unremarkable.       1. Right hypoglossal nerve stimulator with a partially visualized battery pack in the right chest wall. Soft tissue and skin thickening overlying the battery pack with questionable associated fluid collection. No additional inflammatory change or collection along the course of the lead. 2. No additional acute abnormality of the soft tissues of the neck.     I personally reviewed the image(s)/study and resident interpretation as stated by Dr. Kay Peters MD. I agree with the findings as stated. This study was interpreted at [facility], [place], [place].   MACRO: None     Dictation workstation:   [id number]    CT chest w IV contrast    Result Date: 10/12/2024  STUDY: CT CHEST W IV CONTRAST;  10/12/2024 3:13 am   INDICATION: Signs/Symptoms:concern for hypoglossal nerve stimulator infection. .   COMPARISON: None.   ACCESSION NUMBER(S): [id number]   ORDERING CLINICIAN: VJ THORNE   TECHNIQUE: Helical data acquisition of the chest was obtained. Images were  reformatted in axial, coronal, and sagittal planes.   A total of 75 mL of Omnipaque 350 was administered prior to this exam without immediate complication.   FINDINGS: LUNGS AND AIRWAYS: The trachea and central airways are patent. No endobronchial lesion.   No airspace opacity, effusion, or pneumothorax. Mild dependent atelectasis.   MEDIASTINUM AND LUCINDA, LOWER NECK AND AXILLA: The visualized thyroid gland is within normal limits.   No evidence of thoracic lymphadenopathy by CT criteria.   Esophagus appears within normal limits as seen.   HEART AND VESSELS: The thoracic aorta is of normal course and caliber without vascular calcifications.   Main pulmonary artery and its branches are normal in caliber.   No coronary artery calcifications are seen. The study is not optimized for evaluation of coronary arteries.   The cardiac chambers are not enlarged.   No evidence of pericardial effusion.   UPPER ABDOMEN: Partially visualized parapelvic cysts within the left kidney. No additional significant abnormality of the subdiaphragmatic structures.   CHEST WALL AND OSSEOUS STRUCTURES: Right chest wall stimulator battery pack in place, partially included within the field of view. There is focal soft tissue swelling and skin thickening overlying the battery pack with a questionable ill-defined associated collection, however evaluation is limited due to extensive beam hardening artifact. No additional soft tissue abnormality of the chest wall. Osseous structures are unremarkable.       1. Soft tissue swelling and skin thickening overlying a nerve stimulator battery pack of the right chest wall suggestive of cellulitis. Questionable ill-defined collection overlying the battery pack, however evaluation is limited due to beam hardening artifact. 2. No additional intrathoracic abnormality.   I personally reviewed the image(s)/study and resident interpretation as stated by Dr. Kay Peters MD. I agree with the findings as  stated. This study was interpreted at [facility], [place], [place].   MACRO: None     Dictation workstation:   [id number]     Results for orders placed or performed during the hospital encounter of 10/11/24 (from the past 24 hour(s))   CBC   Result Value Ref Range    WBC 5.0 4.4 - 11.3 x10*3/uL    nRBC 0.0 0.0 - 0.0 /100 WBCs    RBC 5.17 4.00 - 5.90 x10*6/uL    Hemoglobin 15.3 12.0 - 17.5 g/dL    Hematocrit 44.9 36.0 - 52.0 %    MCV 87 80 - 100 fL    MCH 29.6 26.0 - 34.0 pg    MCHC 34.1 32.0 - 36.0 g/dL    RDW 12.4 11.5 - 14.5 %    Platelets 165 150 - 450 x10*3/uL   Coagulation Screen   Result Value Ref Range    Protime 11.4 9.8 - 12.8 seconds    INR 1.0 0.9 - 1.1    aPTT 32 27 - 38 seconds   Basic metabolic panel   Result Value Ref Range    Glucose 120 (H) 74 - 99 mg/dL    Sodium 139 136 - 145 mmol/L    Potassium 3.7 3.5 - 5.3 mmol/L    Chloride 104 98 - 107 mmol/L    Bicarbonate 26 21 - 32 mmol/L    Anion Gap 13 10 - 20 mmol/L    Urea Nitrogen 29 (H) 6 - 23 mg/dL    Creatinine 1.30 0.50 - 1.30 mg/dL    eGFR 51 (L) >60 mL/min/1.73m*2    Calcium 9.2 8.6 - 10.6 mg/dL   Magnesium   Result Value Ref Range    Magnesium 2.11 1.60 - 2.40 mg/dL   Type And Screen   Result Value Ref Range    ABO TYPE O     Rh TYPE POS     ANTIBODY SCREEN NEG                     Assessment/Plan   Assessment & Plan  Soft tissue infection    50 y/o M with history of WILLIAM presenting as a transfer for management of possible infection of his hypoglossal nerve stimulator. CT scan of chest and neck showing  cellulitis and a questionable collection surrounding the battery of the hypoglossal nerve stimulator.      Plan:  - Pending transfer to ENT service   - Ok for CLD per ENT  - Empiric vanc/zosyn  - Thoracic surgery signing off      Seen and Discussed with attending, Dr. Momin.    Thoracic Surgery [id number]        Rain Williamson DO

## 2024-10-13 VITALS
OXYGEN SATURATION: 96 % | TEMPERATURE: 97.2 F | SYSTOLIC BLOOD PRESSURE: 136 MMHG | HEART RATE: 64 BPM | WEIGHT: 216.2 LBS | DIASTOLIC BLOOD PRESSURE: 80 MMHG | RESPIRATION RATE: 16 BRPM | BODY MASS INDEX: 32.87 KG/M2

## 2024-10-13 LAB
GRAM STN SPEC: ABNORMAL
GRAM STN SPEC: ABNORMAL
VANCOMYCIN SERPL-MCNC: 25.9 UG/ML (ref 5–20)

## 2024-10-13 PROCEDURE — 2500000004 HC RX 250 GENERAL PHARMACY W/ HCPCS (ALT 636 FOR OP/ED)

## 2024-10-13 PROCEDURE — 99232 SBSQ HOSP IP/OBS MODERATE 35: CPT | Performed by: OTOLARYNGOLOGY

## 2024-10-13 PROCEDURE — 2500000004 HC RX 250 GENERAL PHARMACY W/ HCPCS (ALT 636 FOR OP/ED): Performed by: STUDENT IN AN ORGANIZED HEALTH CARE EDUCATION/TRAINING PROGRAM

## 2024-10-13 PROCEDURE — 36415 COLL VENOUS BLD VENIPUNCTURE: CPT

## 2024-10-13 PROCEDURE — 80202 ASSAY OF VANCOMYCIN: CPT

## 2024-10-13 PROCEDURE — 87070 CULTURE OTHR SPECIMN AEROBIC: CPT | Performed by: STUDENT IN AN ORGANIZED HEALTH CARE EDUCATION/TRAINING PROGRAM

## 2024-10-13 PROCEDURE — 0W983ZZ DRAINAGE OF CHEST WALL, PERCUTANEOUS APPROACH: ICD-10-PCS | Performed by: OTOLARYNGOLOGY

## 2024-10-13 PROCEDURE — 1200000003 HC ONCOLOGY  ROOM WITH TELEMETRY DAILY

## 2024-10-13 PROCEDURE — 2500000004 HC RX 250 GENERAL PHARMACY W/ HCPCS (ALT 636 FOR OP/ED): Performed by: OTOLARYNGOLOGY

## 2024-10-13 PROCEDURE — 2500000001 HC RX 250 WO HCPCS SELF ADMINISTERED DRUGS (ALT 637 FOR MEDICARE OP)

## 2024-10-13 RX ORDER — HYDROMORPHONE HYDROCHLORIDE 1 MG/ML
0.2 INJECTION, SOLUTION INTRAMUSCULAR; INTRAVENOUS; SUBCUTANEOUS EVERY 4 HOURS PRN
Status: DISCONTINUED | OUTPATIENT
Start: 2024-10-13 | End: 2024-10-17 | Stop reason: HOSPADM

## 2024-10-13 RX ORDER — OXYCODONE HYDROCHLORIDE 5 MG/1
5 TABLET ORAL EVERY 4 HOURS PRN
Status: DISCONTINUED | OUTPATIENT
Start: 2024-10-13 | End: 2024-10-17 | Stop reason: HOSPADM

## 2024-10-13 SDOH — SOCIAL STABILITY: SOCIAL INSECURITY
WITHIN THE LAST YEAR, HAVE TO BEEN RAPED OR FORCED TO HAVE ANY KIND OF SEXUAL ACTIVITY BY YOUR PARTNER OR EX-PARTNER?: NO

## 2024-10-13 SDOH — SOCIAL STABILITY: SOCIAL INSECURITY
WITHIN THE LAST YEAR, HAVE YOU BEEN RAPED OR FORCED TO HAVE ANY KIND OF SEXUAL ACTIVITY BY YOUR PARTNER OR EX-PARTNER?: NO

## 2024-10-13 SDOH — SOCIAL STABILITY: SOCIAL INSECURITY
WITHIN THE LAST YEAR, HAVE YOU BEEN KICKED, HIT, SLAPPED, OR OTHERWISE PHYSICALLY HURT BY YOUR PARTNER OR EX-PARTNER?: NO

## 2024-10-13 SDOH — SOCIAL STABILITY: SOCIAL INSECURITY: DOES ANYONE TRY TO KEEP YOU FROM HAVING/CONTACTING OTHER FRIENDS OR DOING THINGS OUTSIDE YOUR HOME?: NO

## 2024-10-13 SDOH — ECONOMIC STABILITY: FOOD INSECURITY: WITHIN THE PAST 12 MONTHS, YOU WORRIED THAT YOUR FOOD WOULD RUN OUT BEFORE YOU GOT MONEY TO BUY MORE.: NEVER TRUE

## 2024-10-13 SDOH — SOCIAL STABILITY: SOCIAL INSECURITY: WERE YOU ABLE TO COMPLETE ALL THE BEHAVIORAL HEALTH SCREENINGS?: YES

## 2024-10-13 SDOH — ECONOMIC STABILITY: FOOD INSECURITY: WITHIN THE PAST 12 MONTHS, THE FOOD YOU BOUGHT JUST DIDN'T LAST AND YOU DIDN'T HAVE MONEY TO GET MORE.: NEVER TRUE

## 2024-10-13 SDOH — SOCIAL STABILITY: SOCIAL INSECURITY: DO YOU FEEL UNSAFE GOING BACK TO THE PLACE WHERE YOU ARE LIVING?: NO

## 2024-10-13 SDOH — SOCIAL STABILITY: SOCIAL INSECURITY: ARE YOU OR HAVE YOU BEEN THREATENED OR ABUSED PHYSICALLY, EMOTIONALLY, OR SEXUALLY BY ANYONE?: NO

## 2024-10-13 SDOH — SOCIAL STABILITY: SOCIAL INSECURITY: WITHIN THE LAST YEAR, HAVE YOU BEEN HUMILIATED OR EMOTIONALLY ABUSED IN OTHER WAYS BY YOUR PARTNER OR EX-PARTNER?: NO

## 2024-10-13 SDOH — SOCIAL STABILITY: SOCIAL INSECURITY: WITHIN THE LAST YEAR, HAVE YOU BEEN AFRAID OF YOUR PARTNER OR EX-PARTNER?: NO

## 2024-10-13 SDOH — ECONOMIC STABILITY: FOOD INSECURITY: WITHIN THE PAST 12 MONTHS, YOU WORRIED THAT YOUR FOOD WOULD RUN OUT BEFORE YOU GOT THE MONEY TO BUY MORE.: NEVER TRUE

## 2024-10-13 SDOH — ECONOMIC STABILITY: INCOME INSECURITY: IN THE PAST 12 MONTHS HAS THE ELECTRIC, GAS, OIL, OR WATER COMPANY THREATENED TO SHUT OFF SERVICES IN YOUR HOME?: NO

## 2024-10-13 SDOH — ECONOMIC STABILITY: INCOME INSECURITY: IN THE PAST 12 MONTHS, HAS THE ELECTRIC, GAS, OIL, OR WATER COMPANY THREATENED TO SHUT OFF SERVICE IN YOUR HOME?: NO

## 2024-10-13 SDOH — SOCIAL STABILITY: SOCIAL INSECURITY: HAS ANYONE EVER THREATENED TO HURT YOUR FAMILY OR YOUR PETS?: NO

## 2024-10-13 SDOH — SOCIAL STABILITY: SOCIAL INSECURITY: HAVE YOU HAD THOUGHTS OF HARMING ANYONE ELSE?: NO

## 2024-10-13 SDOH — SOCIAL STABILITY: SOCIAL INSECURITY: DO YOU FEEL ANYONE HAS EXPLOITED OR TAKEN ADVANTAGE OF YOU FINANCIALLY OR OF YOUR PERSONAL PROPERTY?: NO

## 2024-10-13 SDOH — SOCIAL STABILITY: SOCIAL INSECURITY: ARE THERE ANY APPARENT SIGNS OF INJURIES/BEHAVIORS THAT COULD BE RELATED TO ABUSE/NEGLECT?: NO

## 2024-10-13 SDOH — SOCIAL STABILITY: SOCIAL INSECURITY: ABUSE: ADULT

## 2024-10-13 ASSESSMENT — LIFESTYLE VARIABLES
AUDIT-C TOTAL SCORE: 0
HOW MANY STANDARD DRINKS CONTAINING ALCOHOL DO YOU HAVE ON A TYPICAL DAY: PATIENT DOES NOT DRINK
AUDIT-C TOTAL SCORE: 0
SKIP TO QUESTIONS 9-10: 1
HOW OFTEN DO YOU HAVE A DRINK CONTAINING ALCOHOL: NEVER
HOW OFTEN DO YOU HAVE 6 OR MORE DRINKS ON ONE OCCASION: NEVER

## 2024-10-13 ASSESSMENT — PAIN - FUNCTIONAL ASSESSMENT
PAIN_FUNCTIONAL_ASSESSMENT: 0-10
PAIN_FUNCTIONAL_ASSESSMENT: 0-10
PAIN_FUNCTIONAL_ASSESSMENT: WONG-BAKER FACES

## 2024-10-13 ASSESSMENT — COGNITIVE AND FUNCTIONAL STATUS - GENERAL
MOBILITY SCORE: 24
DAILY ACTIVITIY SCORE: 24
PATIENT BASELINE BEDBOUND: NO

## 2024-10-13 ASSESSMENT — PAIN SCALES - GENERAL
PAINLEVEL_OUTOF10: 0 - NO PAIN
PAINLEVEL_OUTOF10: 8
PAINLEVEL_OUTOF10: 8
PAINLEVEL_OUTOF10: 0 - NO PAIN
PAINLEVEL_OUTOF10: 7
PAINLEVEL_OUTOF10: 9
PAINLEVEL_OUTOF10: 0 - NO PAIN

## 2024-10-13 ASSESSMENT — ACTIVITIES OF DAILY LIVING (ADL)
HEARING - LEFT EAR: FUNCTIONAL
HEARING - RIGHT EAR: FUNCTIONAL
JUDGMENT_ADEQUATE_SAFELY_COMPLETE_DAILY_ACTIVITIES: YES
ADEQUATE_TO_COMPLETE_ADL: YES
BATHING: INDEPENDENT
FEEDING YOURSELF: INDEPENDENT
WALKS IN HOME: INDEPENDENT
TOILETING: INDEPENDENT
GROOMING: INDEPENDENT
DRESSING YOURSELF: INDEPENDENT
LACK_OF_TRANSPORTATION: NO
PATIENT'S MEMORY ADEQUATE TO SAFELY COMPLETE DAILY ACTIVITIES?: YES

## 2024-10-13 ASSESSMENT — PAIN DESCRIPTION - LOCATION
LOCATION: CHEST

## 2024-10-13 ASSESSMENT — PATIENT HEALTH QUESTIONNAIRE - PHQ9
2. FEELING DOWN, DEPRESSED OR HOPELESS: NOT AT ALL
1. LITTLE INTEREST OR PLEASURE IN DOING THINGS: NOT AT ALL
SUM OF ALL RESPONSES TO PHQ9 QUESTIONS 1 & 2: 0

## 2024-10-13 ASSESSMENT — PAIN DESCRIPTION - DESCRIPTORS: DESCRIPTORS: ACHING

## 2024-10-13 ASSESSMENT — COLUMBIA-SUICIDE SEVERITY RATING SCALE - C-SSRS
1. IN THE PAST MONTH, HAVE YOU WISHED YOU WERE DEAD OR WISHED YOU COULD GO TO SLEEP AND NOT WAKE UP?: NO
6. HAVE YOU EVER DONE ANYTHING, STARTED TO DO ANYTHING, OR PREPARED TO DO ANYTHING TO END YOUR LIFE?: NO
2. HAVE YOU ACTUALLY HAD ANY THOUGHTS OF KILLING YOURSELF?: NO

## 2024-10-13 NOTE — CARE PLAN
The patient's goals for the shift include      The clinical goals for the shift include Patient will have adequate pain control throughout shift rating pain less than 4/10      Problem: Pain - Adult  Goal: Verbalizes/displays adequate comfort level or baseline comfort level  Outcome: Progressing     Problem: Safety - Adult  Goal: Free from fall injury  Outcome: Progressing     Problem: Discharge Planning  Goal: Discharge to home or other facility with appropriate resources  Outcome: Progressing     Problem: Chronic Conditions and Co-morbidities  Goal: Patient's chronic conditions and co-morbidity symptoms are monitored and maintained or improved  Outcome: Progressing     Problem: Pain  Goal: Takes deep breaths with improved pain control throughout the shift  Outcome: Progressing  Goal: Turns in bed with improved pain control throughout the shift  Outcome: Progressing  Goal: Walks with improved pain control throughout the shift  Outcome: Progressing  Goal: Performs ADL's with improved pain control throughout shift  Outcome: Progressing  Goal: Participates in PT with improved pain control throughout the shift  Outcome: Progressing  Goal: Free from opioid side effects throughout the shift  Outcome: Progressing  Goal: Free from acute confusion related to pain meds throughout the shift  Outcome: Progressing

## 2024-10-13 NOTE — PROGRESS NOTES
Assessment per chart; from home with spouse  Admit for pain/related to nerve stimulation placement 2020   IVAB and s/p needle aspiration and sent culture     TCC team to follow for any home going needs.

## 2024-10-13 NOTE — PROGRESS NOTES
Lul Lizama is a 49 y.o. adult on day 2 of admission presenting with Soft tissue infection.     Subjective   No acute events overnight. Continues with pain over implant site. Afebrile.     Objective   GEN: The patient appears stated age in no acute distress  VOICE: No dysphonia, volume is appropriate, no pitch/voice breaks   RESP: Unlabored on room air with no audible stertor or stridor  CV: Clinically well perfused   EYES: EOM grossly intact with no scleral icterus  NEURO: Alert and oriented with no focal deficits and CN II-XII symmetric and intact bilaterally  HEAD: Scalp is normocephalic and atraumatic  FACE: No abrasions or lacerations, no maxillary or mandibular stepoffs  EARS: Normal external ears  NOSE: External nose appears normal  OC: Normal lips, normal buccal mucosa, normal alveolar ridge, normal floor of mouth, normal tongue, normal hard palate, normal retromolar trigone  OP: normal pharyngeal walls, normal soft palate  NECK: Trachea midline, no significant lymphadenopathy  Chest: Well healed scar over right chest, area approximately 3x4cm erythematous with surrounding induration, superficial fluctuance.  Site is exquisitely tender to palpation.  No evidence of wound breakdown.    Last Recorded Vitals  Blood pressure 120/74, pulse 61, temperature 36.1 °C (97 °F), temperature source Temporal, resp. rate 18, weight 98.1 kg (216 lb 3.2 oz), SpO2 98%.  Intake/Output last 3 Shifts:  I/O last 3 completed shifts:  In: 1070 (10.9 mL/kg) [P.O.:670; IV Piggyback:400]  Out: - (0 mL/kg)   Weight: 98.1 kg       Assessment/Plan   Assessment & Plan  Soft tissue infection     49 y.o. adult who underwent hypoglossal nerve stimulation placement 2020 now presenting with acute erythema and pain of his right chest incision and implant site.  Patient has no constitutional symptoms.  CT imaging demonstrates no clear fluid collection. On exam today there is concern for development of some fluctuance. He remains stable,  afebrile, VSS.     -Initiate broad-spectrum antibiotics - recommend at least 48 hours of abx before we would recommend operative intervention plans unless erythema extends or patient clinical condition worsens  -will attempt needle aspiration and sent culture to guide further antibiotic treatment  -NPO at midnight in the event of need for surgical intervention on 10/14      Attending attestation: I, Rubén Lnadon MD FACS, reviewed the note and performed a complete history and physical on the patient and examined the patient at the bedside. I agree with the note as outlined with changes made by myself directly to this note.

## 2024-10-13 NOTE — PROCEDURES
Procedure Note: Incision and Drainage  Risks, benefits alternatives of incision and drainage discussed with patient. Verbal consent obtained. Anesthetized patient with 3 cc of 1% lidocaine with 1:100,000 epinephrine. The area was cleansed with betadine. An 18 g needle was used to aspirate the area of fluctuance. 0.5 mL of purulent material expressed and sent for culture. Patient did not tolerate multiple attempts due to discomfort/nausea.

## 2024-10-14 LAB
ERYTHROCYTE [DISTWIDTH] IN BLOOD BY AUTOMATED COUNT: 12.6 % (ref 11.5–14.5)
HCT VFR BLD AUTO: 41.5 % (ref 36–52)
HGB BLD-MCNC: 14.5 G/DL (ref 12–17.5)
MCH RBC QN AUTO: 31 PG (ref 26–34)
MCHC RBC AUTO-ENTMCNC: 34.9 G/DL (ref 32–36)
MCV RBC AUTO: 89 FL (ref 80–100)
NRBC BLD-RTO: 0 /100 WBCS (ref 0–0)
PLATELET # BLD AUTO: 160 X10*3/UL (ref 150–450)
RBC # BLD AUTO: 4.68 X10*6/UL (ref 4–5.9)
WBC # BLD AUTO: 6.6 X10*3/UL (ref 4.4–11.3)

## 2024-10-14 PROCEDURE — 2500000004 HC RX 250 GENERAL PHARMACY W/ HCPCS (ALT 636 FOR OP/ED)

## 2024-10-14 PROCEDURE — 2500000004 HC RX 250 GENERAL PHARMACY W/ HCPCS (ALT 636 FOR OP/ED): Performed by: OTOLARYNGOLOGY

## 2024-10-14 PROCEDURE — 85027 COMPLETE CBC AUTOMATED: CPT | Performed by: STUDENT IN AN ORGANIZED HEALTH CARE EDUCATION/TRAINING PROGRAM

## 2024-10-14 PROCEDURE — 2500000001 HC RX 250 WO HCPCS SELF ADMINISTERED DRUGS (ALT 637 FOR MEDICARE OP)

## 2024-10-14 PROCEDURE — 1200000003 HC ONCOLOGY  ROOM WITH TELEMETRY DAILY

## 2024-10-14 PROCEDURE — 2500000001 HC RX 250 WO HCPCS SELF ADMINISTERED DRUGS (ALT 637 FOR MEDICARE OP): Performed by: STUDENT IN AN ORGANIZED HEALTH CARE EDUCATION/TRAINING PROGRAM

## 2024-10-14 PROCEDURE — 36415 COLL VENOUS BLD VENIPUNCTURE: CPT | Performed by: STUDENT IN AN ORGANIZED HEALTH CARE EDUCATION/TRAINING PROGRAM

## 2024-10-14 ASSESSMENT — PAIN - FUNCTIONAL ASSESSMENT
PAIN_FUNCTIONAL_ASSESSMENT: 0-10

## 2024-10-14 ASSESSMENT — PAIN SCALES - GENERAL
PAINLEVEL_OUTOF10: 8
PAINLEVEL_OUTOF10: 4
PAINLEVEL_OUTOF10: 8
PAINLEVEL_OUTOF10: 6
PAINLEVEL_OUTOF10: 7

## 2024-10-14 NOTE — CARE PLAN
The patient's goals for the shift include      The clinical goals for the shift include patient will remain comfortable throughout shift      Problem: Pain - Adult  Goal: Verbalizes/displays adequate comfort level or baseline comfort level  Outcome: Progressing     Problem: Safety - Adult  Goal: Free from fall injury  Outcome: Progressing     Problem: Discharge Planning  Goal: Discharge to home or other facility with appropriate resources  Outcome: Progressing     Problem: Chronic Conditions and Co-morbidities  Goal: Patient's chronic conditions and co-morbidity symptoms are monitored and maintained or improved  Outcome: Progressing     Problem: Pain  Goal: Takes deep breaths with improved pain control throughout the shift  Outcome: Progressing  Goal: Turns in bed with improved pain control throughout the shift  Outcome: Progressing  Goal: Walks with improved pain control throughout the shift  Outcome: Progressing  Goal: Performs ADL's with improved pain control throughout shift  Outcome: Progressing  Goal: Participates in PT with improved pain control throughout the shift  Outcome: Progressing  Goal: Free from opioid side effects throughout the shift  Outcome: Progressing  Goal: Free from acute confusion related to pain meds throughout the shift  Outcome: Progressing

## 2024-10-14 NOTE — PROGRESS NOTES
Lul Lizama is a 49 y.o. adult on day 3 of admission presenting with Soft tissue infection.     Subjective   No acute events overnight. Continues with pain over implant site. Afebrile.     Objective   GEN: The patient appears stated age in no acute distress  VOICE: No dysphonia, volume is appropriate, no pitch/voice breaks   RESP: Unlabored on room air with no audible stertor or stridor  CV: Clinically well perfused   EYES: EOM grossly intact with no scleral icterus  NEURO: Alert and oriented with no focal deficits and CN II-XII symmetric and intact bilaterally  HEAD: Scalp is normocephalic and atraumatic  FACE: No abrasions or lacerations, no maxillary or mandibular stepoffs  EARS: Normal external ears  NOSE: External nose appears normal  OC: Normal lips, normal buccal mucosa, normal alveolar ridge, normal floor of mouth, normal tongue, normal hard palate, normal retromolar trigone  OP: normal pharyngeal walls, normal soft palate  NECK: Trachea midline, no significant lymphadenopathy  Chest: Well healed scar over right chest, area approximately 3x4cm erythematous with surrounding induration, superficial fluctuance.  Site is exquisitely tender to palpation.  No evidence of wound breakdown.    Last Recorded Vitals  Blood pressure 121/71, pulse 71, temperature 36.1 °C (97 °F), temperature source Temporal, resp. rate 16, weight 98.1 kg (216 lb 3.2 oz), SpO2 97%.  Intake/Output last 3 Shifts:  I/O last 3 completed shifts:  In: 300 (3.1 mL/kg) [IV Piggyback:300]  Out: - (0 mL/kg)   Weight: 98.1 kg       Assessment/Plan   Assessment & Plan  Soft tissue infection    49 y.o. adult who underwent hypoglossal nerve stimulation placement 2020 now presenting with acute erythema and pain of his right chest incision and implant site.  Patient has no constitutional symptoms.  CT imaging demonstrates no clear fluid collection. On exam there is concern for development of some fluctuance. He remains stable, afebrile, VSS.      -Continue vanc/zosyn, ID consult  -Will plan for OR tomorrow for explantation  -Regular diet, NPO at midnight    Patient seen and discussed with Dr. Marx.    Svitlana Gonzalez MD  PGY-4  Otolaryngology - Head and Neck Surgery  Personal: [phone] (Epic Chat preferred), Adult ENT Consults: [id number], Peds ENT Team: [id number]

## 2024-10-14 NOTE — PROGRESS NOTES
Lul Lizama is a 49 y.o. adult on day 3 of admission presenting with Soft tissue infection.     Subjective   No acute events overnight. Continues to have unchanged burning pain over implant site. Afebrile. No new concerns or complaints     Objective   GEN: The patient appears stated age in no acute distress  VOICE: No dysphonia, volume is appropriate, no pitch/voice breaks   RESP: Unlabored on room air with no audible stertor or stridor  CV: Clinically well perfused   EYES: EOM grossly intact with no scleral icterus  NEURO: Alert and oriented with no focal deficits and CN II-XII symmetric and intact bilaterally  HEAD: Scalp is normocephalic and atraumatic  FACE: No abrasions or lacerations, no maxillary or mandibular stepoffs  EARS: Normal external ears  NOSE: External nose appears normal  OC: Normal lips, normal buccal mucosa, normal alveolar ridge, normal floor of mouth, normal tongue, normal hard palate, normal retromolar trigone  OP: normal pharyngeal walls, normal soft palate  NECK: Trachea midline, no significant lymphadenopathy  Chest: Well healed scar over right chest, area approximately 3x4cm erythematous with surrounding induration, possible superficial fluctuance.  Site is exquisitely tender to palpation.  No evidence of wound breakdown.    Last Recorded Vitals  Blood pressure 141/76, pulse 69, temperature 35.7 °C (96.3 °F), temperature source Temporal, resp. rate 14, weight 98.1 kg (216 lb 3.2 oz), SpO2 96%.  Intake/Output last 3 Shifts:  I/O last 3 completed shifts:  In: 720 (7.3 mL/kg) [P.O.:420; IV Piggyback:300]  Out: - (0 mL/kg)   Weight: 98.1 kg       Assessment/Plan   Assessment & Plan  Soft tissue infection     49 y.o. adult who underwent hypoglossal nerve stimulation placement 2020 now presenting with acute erythema and pain of his right chest incision and implant site.  Patient has no constitutional symptoms.  CT imaging demonstrates no clear fluid collection. On exam today there is concern  for development of some fluctuance. He remains stable, afebrile, VSS.     -Continue vanc/zosyn  -Monitor clinically. Plan for future intervention after acute infection improves if patient remains reassuring  -ID consult  -Regular diet

## 2024-10-15 LAB
ALBUMIN SERPL BCP-MCNC: 3.9 G/DL (ref 3.4–5)
ANION GAP SERPL CALC-SCNC: 10 MMOL/L (ref 10–20)
BUN SERPL-MCNC: 13 MG/DL (ref 6–23)
CALCIUM SERPL-MCNC: 9.2 MG/DL (ref 8.6–10.6)
CHLORIDE SERPL-SCNC: 102 MMOL/L (ref 98–107)
CO2 SERPL-SCNC: 31 MMOL/L (ref 21–32)
CREAT SERPL-MCNC: 1.77 MG/DL (ref 0.5–1.3)
EGFRCR SERPLBLD CKD-EPI 2021: 47 ML/MIN/1.73M*2
ERYTHROCYTE [DISTWIDTH] IN BLOOD BY AUTOMATED COUNT: 12.1 % (ref 11.5–14.5)
GLUCOSE SERPL-MCNC: 116 MG/DL (ref 74–99)
HCT VFR BLD AUTO: 40.3 % (ref 36–52)
HGB BLD-MCNC: 13.9 G/DL (ref 12–17.5)
MAGNESIUM SERPL-MCNC: 2.02 MG/DL (ref 1.6–2.4)
MCH RBC QN AUTO: 31.2 PG (ref 26–34)
MCHC RBC AUTO-ENTMCNC: 34.5 G/DL (ref 32–36)
MCV RBC AUTO: 90 FL (ref 80–100)
NRBC BLD-RTO: 0 /100 WBCS (ref 0–0)
PHOSPHATE SERPL-MCNC: 3.7 MG/DL (ref 2.5–4.9)
PLATELET # BLD AUTO: 145 X10*3/UL (ref 150–450)
POTASSIUM SERPL-SCNC: 4.1 MMOL/L (ref 3.5–5.3)
RBC # BLD AUTO: 4.46 X10*6/UL (ref 4–5.9)
SODIUM SERPL-SCNC: 139 MMOL/L (ref 136–145)
WBC # BLD AUTO: 5.4 X10*3/UL (ref 4.4–11.3)

## 2024-10-15 PROCEDURE — 36415 COLL VENOUS BLD VENIPUNCTURE: CPT | Performed by: STUDENT IN AN ORGANIZED HEALTH CARE EDUCATION/TRAINING PROGRAM

## 2024-10-15 PROCEDURE — 80069 RENAL FUNCTION PANEL: CPT | Performed by: STUDENT IN AN ORGANIZED HEALTH CARE EDUCATION/TRAINING PROGRAM

## 2024-10-15 PROCEDURE — 1200000003 HC ONCOLOGY  ROOM WITH TELEMETRY DAILY

## 2024-10-15 PROCEDURE — 2500000001 HC RX 250 WO HCPCS SELF ADMINISTERED DRUGS (ALT 637 FOR MEDICARE OP)

## 2024-10-15 PROCEDURE — 2500000004 HC RX 250 GENERAL PHARMACY W/ HCPCS (ALT 636 FOR OP/ED): Performed by: OTOLARYNGOLOGY

## 2024-10-15 PROCEDURE — 83735 ASSAY OF MAGNESIUM: CPT | Performed by: STUDENT IN AN ORGANIZED HEALTH CARE EDUCATION/TRAINING PROGRAM

## 2024-10-15 PROCEDURE — 99221 1ST HOSP IP/OBS SF/LOW 40: CPT | Performed by: INTERNAL MEDICINE

## 2024-10-15 PROCEDURE — 2500000004 HC RX 250 GENERAL PHARMACY W/ HCPCS (ALT 636 FOR OP/ED)

## 2024-10-15 PROCEDURE — 85027 COMPLETE CBC AUTOMATED: CPT | Performed by: STUDENT IN AN ORGANIZED HEALTH CARE EDUCATION/TRAINING PROGRAM

## 2024-10-15 ASSESSMENT — PAIN SCALES - GENERAL
PAINLEVEL_OUTOF10: 7
PAINLEVEL_OUTOF10: 7
PAINLEVEL_OUTOF10: 8
PAINLEVEL_OUTOF10: 7
PAINLEVEL_OUTOF10: 3
PAINLEVEL_OUTOF10: 0 - NO PAIN

## 2024-10-15 ASSESSMENT — PAIN DESCRIPTION - LOCATION
LOCATION: CHEST
LOCATION: CHEST

## 2024-10-15 ASSESSMENT — ENCOUNTER SYMPTOMS
FEVER: 0
FATIGUE: 0
CHILLS: 0
ABDOMINAL PAIN: 0
APPETITE CHANGE: 0
DIAPHORESIS: 0
TROUBLE SWALLOWING: 0
CHEST TIGHTNESS: 0
UNEXPECTED WEIGHT CHANGE: 0
SHORTNESS OF BREATH: 0
WEAKNESS: 0
HEADACHES: 0

## 2024-10-15 ASSESSMENT — PAIN - FUNCTIONAL ASSESSMENT
PAIN_FUNCTIONAL_ASSESSMENT: 0-10
PAIN_FUNCTIONAL_ASSESSMENT: WONG-BAKER FACES
PAIN_FUNCTIONAL_ASSESSMENT: 0-10

## 2024-10-15 ASSESSMENT — ACTIVITIES OF DAILY LIVING (ADL): LACK_OF_TRANSPORTATION: NO

## 2024-10-15 ASSESSMENT — PAIN DESCRIPTION - ORIENTATION: ORIENTATION: RIGHT

## 2024-10-15 NOTE — PROGRESS NOTES
Lul Lizama is a 49 y.o. adult on day 4 of admission presenting with Soft tissue infection.     Subjective   No acute events overnight. Remains afebrile.     Objective   GEN: The patient appears stated age in no acute distress  VOICE: No dysphonia, volume is appropriate, no pitch/voice breaks   RESP: Unlabored on room air with no audible stertor or stridor  CV: Clinically well perfused   EYES: EOM grossly intact with no scleral icterus  NEURO: Alert and oriented with no focal deficits and CN II-XII symmetric and intact bilaterally  HEAD: Scalp is normocephalic and atraumatic  FACE: No abrasions or lacerations, no maxillary or mandibular stepoffs  EARS: Normal external ears  NOSE: External nose appears normal  OC: Normal lips, normal buccal mucosa, normal alveolar ridge, normal floor of mouth, normal tongue, normal hard palate, normal retromolar trigone  OP: normal pharyngeal walls, normal soft palate  NECK: Trachea midline, no significant lymphadenopathy  Chest: Well healed scar over right chest, area approximately 3x4cm erythematous with surrounding induration, superficial fluctuance.  Site is tender to palpation.     Last Recorded Vitals  Blood pressure 132/80, pulse 55, temperature 35.9 °C (96.6 °F), temperature source Temporal, resp. rate 18, weight 98.1 kg (216 lb 3.2 oz), SpO2 98%.  Intake/Output last 3 Shifts:  I/O last 3 completed shifts:  In: 100 (1 mL/kg) [IV Piggyback:100]  Out: 0 (0 mL/kg)   Weight: 98.1 kg       Assessment/Plan   Assessment & Plan  Soft tissue infection    49 y.o. adult who underwent hypoglossal nerve stimulation placement 2020 now presenting with acute erythema and pain of his right chest incision and implant site.  Patient has no constitutional symptoms.  CT imaging demonstrates no clear fluid collection. On exam there is concern for development of some fluctuance. He remains stable, afebrile, VSS.     -Scheduled Tylenol, oxycodone and dilaudid prn  -Regular diet, HLIV  -No active  cardioplulmonary issues  -Voiding spontaneously  -Continue vanc/zosyn, ID consult  -Will plan for OR  for explantation 10/17  -Regular diet, NPO at midnight 10/16    Patient discussed with Dr. Sims

## 2024-10-15 NOTE — CARE PLAN
The patient's goals for the shift include      The clinical goals for the shift include pain control/safe transfer to surgery      Problem: Pain - Adult  Goal: Verbalizes/displays adequate comfort level or baseline comfort level  Outcome: Progressing     Problem: Safety - Adult  Goal: Free from fall injury  Outcome: Progressing     Problem: Discharge Planning  Goal: Discharge to home or other facility with appropriate resources  Outcome: Progressing     Problem: Chronic Conditions and Co-morbidities  Goal: Patient's chronic conditions and co-morbidity symptoms are monitored and maintained or improved  Outcome: Progressing     Problem: Pain  Goal: Takes deep breaths with improved pain control throughout the shift  Outcome: Progressing  Goal: Turns in bed with improved pain control throughout the shift  Outcome: Progressing  Goal: Walks with improved pain control throughout the shift  Outcome: Progressing  Goal: Performs ADL's with improved pain control throughout shift  Outcome: Progressing  Goal: Participates in PT with improved pain control throughout the shift  Outcome: Progressing  Goal: Free from opioid side effects throughout the shift  Outcome: Progressing  Goal: Free from acute confusion related to pain meds throughout the shift  Outcome: Progressing

## 2024-10-15 NOTE — PROGRESS NOTES
10/15/24 1100   Discharge Planning   Living Arrangements Spouse/significant other   Support Systems Spouse/significant other   Assistance Needed none   Type of Residence Private residence   Who is requesting discharge planning? Provider   Home or Post Acute Services None   Expected Discharge Disposition Home   Does the patient need discharge transport arranged? No   Financial Resource Strain   How hard is it for you to pay for the very basics like food, housing, medical care, and heating? Not hard   Housing Stability   In the last 12 months, was there a time when you were not able to pay the mortgage or rent on time? N   In the past 12 months, how many times have you moved where you were living? 0   At any time in the past 12 months, were you homeless or living in a shelter (including now)? N   Transportation Needs   In the past 12 months, has lack of transportation kept you from medical appointments or from getting medications? no   In the past 12 months, has lack of transportation kept you from meetings, work, or from getting things needed for daily living? No     TCC Note    Plan per Medical/Surgical Team: presenting with Soft tissue infection   Status: inpatient  Payor Source: [facility]/  choice  Discharge disposition: home  Expected date of discharge: 10/16 vs 10/17  Barriers: none at this time    Pending finally cultures and OR with ENT 10/17. Demographics and insurance verifed with patient. Will continue to follow patient for any discharge needs.   Alyssa Vigil RN TCC

## 2024-10-16 ENCOUNTER — ANESTHESIA EVENT (OUTPATIENT)
Dept: OPERATING ROOM | Facility: HOSPITAL | Age: 49
End: 2024-10-16
Payer: COMMERCIAL

## 2024-10-16 PROBLEM — G47.33 OSA (OBSTRUCTIVE SLEEP APNEA): Status: ACTIVE | Noted: 2024-10-16

## 2024-10-16 LAB
ALBUMIN SERPL BCP-MCNC: 4 G/DL (ref 3.4–5)
ANION GAP SERPL CALC-SCNC: 13 MMOL/L (ref 10–20)
B-LACTAMASE ORGANISM ISLT: POSITIVE
BACTERIA SPEC CULT: ABNORMAL
BUN SERPL-MCNC: 15 MG/DL (ref 6–23)
CALCIUM SERPL-MCNC: 9.1 MG/DL (ref 8.6–10.6)
CHLORIDE SERPL-SCNC: 103 MMOL/L (ref 98–107)
CO2 SERPL-SCNC: 27 MMOL/L (ref 21–32)
CREAT SERPL-MCNC: 1.59 MG/DL (ref 0.5–1.3)
EGFRCR SERPLBLD CKD-EPI 2021: 53 ML/MIN/1.73M*2
ERYTHROCYTE [DISTWIDTH] IN BLOOD BY AUTOMATED COUNT: 12.1 % (ref 11.5–14.5)
GLUCOSE SERPL-MCNC: 94 MG/DL (ref 74–99)
GRAM STN SPEC: ABNORMAL
GRAM STN SPEC: ABNORMAL
HCT VFR BLD AUTO: 41.7 % (ref 36–52)
HGB BLD-MCNC: 14.5 G/DL (ref 12–17.5)
MAGNESIUM SERPL-MCNC: 2.04 MG/DL (ref 1.6–2.4)
MCH RBC QN AUTO: 31.3 PG (ref 26–34)
MCHC RBC AUTO-ENTMCNC: 34.8 G/DL (ref 32–36)
MCV RBC AUTO: 90 FL (ref 80–100)
NRBC BLD-RTO: 0 /100 WBCS (ref 0–0)
PHOSPHATE SERPL-MCNC: 3.6 MG/DL (ref 2.5–4.9)
PLATELET # BLD AUTO: 167 X10*3/UL (ref 150–450)
POTASSIUM SERPL-SCNC: 3.9 MMOL/L (ref 3.5–5.3)
RBC # BLD AUTO: 4.64 X10*6/UL (ref 4–5.9)
SODIUM SERPL-SCNC: 139 MMOL/L (ref 136–145)
VANCOMYCIN SERPL-MCNC: 11.8 UG/ML (ref 5–20)
WBC # BLD AUTO: 6 X10*3/UL (ref 4.4–11.3)

## 2024-10-16 PROCEDURE — 80069 RENAL FUNCTION PANEL: CPT | Performed by: STUDENT IN AN ORGANIZED HEALTH CARE EDUCATION/TRAINING PROGRAM

## 2024-10-16 PROCEDURE — 2500000001 HC RX 250 WO HCPCS SELF ADMINISTERED DRUGS (ALT 637 FOR MEDICARE OP)

## 2024-10-16 PROCEDURE — 85027 COMPLETE CBC AUTOMATED: CPT | Performed by: STUDENT IN AN ORGANIZED HEALTH CARE EDUCATION/TRAINING PROGRAM

## 2024-10-16 PROCEDURE — 36415 COLL VENOUS BLD VENIPUNCTURE: CPT

## 2024-10-16 PROCEDURE — 83735 ASSAY OF MAGNESIUM: CPT | Performed by: STUDENT IN AN ORGANIZED HEALTH CARE EDUCATION/TRAINING PROGRAM

## 2024-10-16 PROCEDURE — 2500000004 HC RX 250 GENERAL PHARMACY W/ HCPCS (ALT 636 FOR OP/ED): Performed by: OTOLARYNGOLOGY

## 2024-10-16 PROCEDURE — 2500000004 HC RX 250 GENERAL PHARMACY W/ HCPCS (ALT 636 FOR OP/ED)

## 2024-10-16 PROCEDURE — 2500000004 HC RX 250 GENERAL PHARMACY W/ HCPCS (ALT 636 FOR OP/ED): Mod: JZ | Performed by: PHARMACIST

## 2024-10-16 PROCEDURE — 80202 ASSAY OF VANCOMYCIN: CPT

## 2024-10-16 PROCEDURE — 1200000003 HC ONCOLOGY  ROOM WITH TELEMETRY DAILY

## 2024-10-16 PROCEDURE — 36415 COLL VENOUS BLD VENIPUNCTURE: CPT | Performed by: STUDENT IN AN ORGANIZED HEALTH CARE EDUCATION/TRAINING PROGRAM

## 2024-10-16 RX ORDER — SODIUM CHLORIDE, SODIUM LACTATE, POTASSIUM CHLORIDE, CALCIUM CHLORIDE 600; 310; 30; 20 MG/100ML; MG/100ML; MG/100ML; MG/100ML
50 INJECTION, SOLUTION INTRAVENOUS CONTINUOUS
Status: DISCONTINUED | OUTPATIENT
Start: 2024-10-17 | End: 2024-10-17

## 2024-10-16 RX ORDER — VANCOMYCIN HYDROCHLORIDE 1 G/200ML
1000 INJECTION, SOLUTION INTRAVENOUS EVERY 12 HOURS
Status: DISCONTINUED | OUTPATIENT
Start: 2024-10-16 | End: 2024-10-16

## 2024-10-16 SDOH — HEALTH STABILITY: MENTAL HEALTH: CURRENT SMOKER: 0

## 2024-10-16 ASSESSMENT — PAIN - FUNCTIONAL ASSESSMENT
PAIN_FUNCTIONAL_ASSESSMENT: 0-10

## 2024-10-16 ASSESSMENT — PAIN DESCRIPTION - ORIENTATION: ORIENTATION: RIGHT

## 2024-10-16 ASSESSMENT — PAIN SCALES - GENERAL
PAINLEVEL_OUTOF10: 7
PAINLEVEL_OUTOF10: 7
PAINLEVEL_OUTOF10: 5 - MODERATE PAIN
PAINLEVEL_OUTOF10: 3
PAINLEVEL_OUTOF10: 7
PAINLEVEL_OUTOF10: 8
PAINLEVEL_OUTOF10: 7

## 2024-10-16 ASSESSMENT — PAIN DESCRIPTION - LOCATION: LOCATION: CHEST

## 2024-10-16 NOTE — H&P (VIEW-ONLY)
Lul Lizama is a 49 y.o. adult on day 5 of admission presenting with Soft tissue infection.     Subjective   No acute events overnight. Remains afebrile.     Objective   GEN: The patient appears stated age in no acute distress  VOICE: No dysphonia, volume is appropriate, no pitch/voice breaks   RESP: Unlabored on room air with no audible stertor or stridor  CV: Clinically well perfused   EYES: EOM grossly intact with no scleral icterus  NEURO: Alert and oriented with no focal deficits and CN II-XII symmetric and intact bilaterally  HEAD: Scalp is normocephalic and atraumatic  FACE: No abrasions or lacerations, no maxillary or mandibular stepoffs  EARS: Normal external ears  NOSE: External nose appears normal  OC: Normal lips, normal buccal mucosa, normal alveolar ridge, normal floor of mouth, normal tongue, normal hard palate, normal retromolar trigone  OP: normal pharyngeal walls, normal soft palate  NECK: Trachea midline, no significant lymphadenopathy  Chest: Well healed scar over right chest, area approximately 3x4cm erythematous with surrounding induration, superficial fluctuance.  Site is tender to palpation.     Last Recorded Vitals  Blood pressure 150/87, pulse 59, temperature 37.3 °C (99.1 °F), temperature source Temporal, resp. rate 16, weight 98.1 kg (216 lb 3.2 oz), SpO2 98%.  Intake/Output last 3 Shifts:  I/O last 3 completed shifts:  In: 1800 (18.4 mL/kg) [P.O.:1000; IV Piggyback:800]  Out: 0 (0 mL/kg)   Weight: 98.1 kg       Assessment/Plan   Assessment & Plan  Soft tissue infection    WILLIAM (obstructive sleep apnea)    49 y.o. adult who underwent hypoglossal nerve stimulation placement 2020 now presenting with acute erythema and pain of his right chest incision and implant site.  Patient has no constitutional symptoms.  CT imaging demonstrates no clear fluid collection. On exam there is concern for development of some fluctuance. He remains stable, afebrile, VSS.     -Scheduled Tylenol, oxycodone  and dilaudid prn  -No active cardioplulmonary issues  -Voiding spontaneously  -Continue vanc/zosyn, ID consult, follow up cultures  -Will plan for OR  for explantation 10/17  -Regular diet, NPO at midnight 10/16    Patient discussed with Dr. Sims

## 2024-10-16 NOTE — CARE PLAN
The patient's goals for the shift include pain management and rest.      The clinical goals for the shift include Pt will report adequate pain management throughout shift.    Over the shift, the patient did not make progress toward the following goals. Barriers to progression include . Recommendations to address these barriers include .      Problem: Pain - Adult  Goal: Verbalizes/displays adequate comfort level or baseline comfort level  Outcome: Progressing     Problem: Safety - Adult  Goal: Free from fall injury  Outcome: Progressing     Problem: Discharge Planning  Goal: Discharge to home or other facility with appropriate resources  Outcome: Progressing     Problem: Chronic Conditions and Co-morbidities  Goal: Patient's chronic conditions and co-morbidity symptoms are monitored and maintained or improved  Outcome: Progressing     Problem: Pain  Goal: Takes deep breaths with improved pain control throughout the shift  Outcome: Progressing  Goal: Turns in bed with improved pain control throughout the shift  Outcome: Progressing  Goal: Walks with improved pain control throughout the shift  Outcome: Progressing  Goal: Performs ADL's with improved pain control throughout shift  Outcome: Progressing  Goal: Participates in PT with improved pain control throughout the shift  Outcome: Progressing  Goal: Free from opioid side effects throughout the shift  Outcome: Progressing  Goal: Free from acute confusion related to pain meds throughout the shift  Outcome: Progressing

## 2024-10-16 NOTE — PROGRESS NOTES
Vancomycin Dosing by Pharmacy- FOLLOW UP    Lul Lizama is a 49 y.o. year old adult who Pharmacy has been consulted for vancomycin dosing for cellulitis, skin and soft tissue. Based on the patient's indication and renal status this patient is being dosed based on a goal AUC of 400-600.     Renal function is currently improving.    Current vancomycin dose: 1000 mg given every 12 hours    Estimated vancomycin AUC on current dose: 384 mg/L.hr     Visit Vitals  /84 (BP Location: Right arm, Patient Position: Lying)   Pulse 56   Temp 36.4 °C (97.5 °F) (Temporal)   Resp 16        Lab Results   Component Value Date    CREATININE 1.59 (H) 10/16/2024    CREATININE 1.77 (H) 10/15/2024    CREATININE 1.30 10/11/2024        Patient weight is as follows:   Vitals:    10/11/24 2152   Weight: 98.1 kg (216 lb 3.2 oz)       Cultures:  Susceptibility data for the encounter in last 14 days.  Collected Specimen Info Organism   10/13/24 Tissue/Biopsy from Skin/Superficial Abscess Mixed Anaerobic Bacteria        I/O last 3 completed shifts:  In: 1800 (18.4 mL/kg) [P.O.:1000; IV Piggyback:800]  Out: 0 (0 mL/kg)   Weight: 98.1 kg   I/O during current shift:  I/O this shift:  In: 50 [IV Piggyback:50]  Out: -     Temp (24hrs), Av.3 °C (97.4 °F), Min:36 °C (96.8 °F), Max:36.7 °C (98.1 °F)      Assessment/Plan    Below goal AUC. Orders placed for new vancomcyin regimen of 1250 every 12 hours to begin at 1600.     This dosing regimen is predicted by [employer] to result in the following pharmacokinetic parameters:  Loading dose: N/A  Regimen: 1250 mg IV every 12 hours.  Start time: 16:13 on 10/16/2024  Exposure target: AUC24 (range)400-600 mg/L.hr   [id number]: 470 mg/L.hr  AUC24,ss: 479 mg/L.hr  Probability of AUC24 > 400: 93 %  Ctrough,ss: 12.1 mg/L  Probability of Ctrough,ss > 20: 0 %      The next level will be obtained on 10/17 at 1000. May be obtained sooner if clinically indicated.   Will continue to monitor renal function daily  while on vancomycin and order serum creatinine at least every 48 hours if not already ordered.  Follow for continued vancomycin needs, clinical response, and signs/symptoms of toxicity.       David Granados, PharmD

## 2024-10-16 NOTE — HOSPITAL COURSE
49 y.o. adult who underwent hypoglossal nerve stimulation placement 2020 now presenting with acute erythema and pain of his right chest incision and implant site. He was admitted on 10/11 and had been on IV antibiotics with some improvement. He is scheduled for surgery to remove the device on an outpatient basis on 10/21 with Dr. Sims. ID evaluated the patient and made recommendations for antibiotics while in house and post discharge. On the day of discharge, the pt was tolerating a diet, pain was controlled on PO pain medication, and they were ambulating and voiding spontaneously. They were discharged gome in stable condition with instructions to follow up as outpatient for surgery.

## 2024-10-16 NOTE — ANESTHESIA PREPROCEDURE EVALUATION
Patient: Lul Lizama    Procedure Information       Date: 10/17/24    Procedure: Nerve Stimulator Implant, Hypoglossal (Right) - EXPLANT OF INFECTED HYPOGLOSSAL NERVE STIMULATOR    Location: [facility]    Surgeons: Laureano Skinner MD            Relevant Problems   Anesthesia (within normal limits)      Pulmonary   (+) WILLIAM (obstructive sleep apnea)      HEENT  Generator infection.      ID   (+) Soft tissue infection       Clinical information reviewed:    Allergies  Meds               No data recorded       No past medical history on file.   No past surgical history on file.  Social History     Tobacco Use    Smoking status: Former     Current packs/day: 0.50     Average packs/day: 0.5 packs/day for 10.0 years (5.0 ttl pk-yrs)     Types: Cigarettes    Smokeless tobacco: Former     Types: Chew   Substance Use Topics    Alcohol use: Yes     Alcohol/week: 2.0 standard drinks of alcohol     Types: 2 Standard drinks or equivalent per week    Drug use: Never      Current Outpatient Medications   Medication Instructions    cholecalciferol (Vitamin D3) 5,000 Units tablet 1 tablet, oral, Daily    pantoprazole (PROTONIX) 40 mg, oral, Daily before breakfast, Do not crush, chew, or split.      No Known Allergies     Chemistry    Lab Results   Component Value Date/Time     10/16/2024 0615    K 3.9 10/16/2024 0615     10/16/2024 0615    CO2 27 10/16/2024 0615    BUN 15 10/16/2024 0615    CREATININE 1.59 (H) 10/16/2024 0615    Lab Results   Component Value Date/Time    CALCIUM 9.1 10/16/2024 0615          Lab Results   Component Value Date/Time    WBC 6.0 10/16/2024 0615    HGB 14.5 10/16/2024 0615    HCT 41.7 10/16/2024 0615     10/16/2024 0615     Lab Results   Component Value Date/Time    PROTIME 11.4 10/11/2024 2319    INR 1.0 10/11/2024 2319     No results found for this or any previous visit (from the past 4464 hours).  No results found for this or any previous visit from the past  1095 days.       Visit Vitals  /84 (BP Location: Right arm, Patient Position: Lying)   Pulse 56   Temp 36.4 °C (97.5 °F) (Temporal)   Resp 16   Wt 98.1 kg (216 lb 3.2 oz)   SpO2 96%   BMI 32.87 kg/m²   Smoking Status Former   BSA 2.17 m²        Anesthesia Evaluation      Airway   Mallampati: IV  Dental      Pulmonary    Cardiovascular     Rhythm: regular  Rate: normal    Neuro/Psych      GI/Hepatic/Renal      Endo/Other    Abdominal                       Physical Exam    Airway  Mallampati: IV     Cardiovascular   Rhythm: regular  Rate: normal     Dental    Pulmonary    Abdominal             Anesthesia Plan    History of general anesthesia?: yes  History of complications of general anesthesia?: no    ASA 2     general     The patient is not a current smoker. (former)    intravenous induction   Anesthetic plan and risks discussed with patient and spouse.    Plan discussed with CRNA.

## 2024-10-17 ENCOUNTER — ANESTHESIA (OUTPATIENT)
Dept: OPERATING ROOM | Facility: HOSPITAL | Age: 49
End: 2024-10-17
Payer: COMMERCIAL

## 2024-10-17 VITALS
BODY MASS INDEX: 32.87 KG/M2 | HEART RATE: 60 BPM | SYSTOLIC BLOOD PRESSURE: 138 MMHG | DIASTOLIC BLOOD PRESSURE: 81 MMHG | RESPIRATION RATE: 16 BRPM | OXYGEN SATURATION: 98 % | TEMPERATURE: 96.8 F | WEIGHT: 216.2 LBS

## 2024-10-17 LAB
ALBUMIN SERPL BCP-MCNC: 4 G/DL (ref 3.4–5)
ANION GAP SERPL CALC-SCNC: 10 MMOL/L (ref 10–20)
BUN SERPL-MCNC: 18 MG/DL (ref 6–23)
CALCIUM SERPL-MCNC: 9.1 MG/DL (ref 8.6–10.6)
CHLORIDE SERPL-SCNC: 103 MMOL/L (ref 98–107)
CO2 SERPL-SCNC: 31 MMOL/L (ref 21–32)
CREAT SERPL-MCNC: 1.79 MG/DL (ref 0.5–1.3)
EGFRCR SERPLBLD CKD-EPI 2021: 46 ML/MIN/1.73M*2
ERYTHROCYTE [DISTWIDTH] IN BLOOD BY AUTOMATED COUNT: 12.2 % (ref 11.5–14.5)
GLUCOSE SERPL-MCNC: 112 MG/DL (ref 74–99)
HCT VFR BLD AUTO: 42.1 % (ref 36–52)
HGB BLD-MCNC: 14.5 G/DL (ref 12–17.5)
MAGNESIUM SERPL-MCNC: 2.11 MG/DL (ref 1.6–2.4)
MCH RBC QN AUTO: 31.7 PG (ref 26–34)
MCHC RBC AUTO-ENTMCNC: 34.4 G/DL (ref 32–36)
MCV RBC AUTO: 92 FL (ref 80–100)
NRBC BLD-RTO: 0 /100 WBCS (ref 0–0)
PHOSPHATE SERPL-MCNC: 4.3 MG/DL (ref 2.5–4.9)
PLATELET # BLD AUTO: 154 X10*3/UL (ref 150–450)
POTASSIUM SERPL-SCNC: 3.8 MMOL/L (ref 3.5–5.3)
RBC # BLD AUTO: 4.57 X10*6/UL (ref 4–5.9)
SODIUM SERPL-SCNC: 140 MMOL/L (ref 136–145)
VANCOMYCIN SERPL-MCNC: 18.1 UG/ML (ref 5–20)
WBC # BLD AUTO: 4.8 X10*3/UL (ref 4.4–11.3)

## 2024-10-17 PROCEDURE — 2500000004 HC RX 250 GENERAL PHARMACY W/ HCPCS (ALT 636 FOR OP/ED)

## 2024-10-17 PROCEDURE — 2500000004 HC RX 250 GENERAL PHARMACY W/ HCPCS (ALT 636 FOR OP/ED): Performed by: PHARMACIST

## 2024-10-17 PROCEDURE — 99239 HOSP IP/OBS DSCHRG MGMT >30: CPT

## 2024-10-17 PROCEDURE — 85027 COMPLETE CBC AUTOMATED: CPT | Performed by: STUDENT IN AN ORGANIZED HEALTH CARE EDUCATION/TRAINING PROGRAM

## 2024-10-17 PROCEDURE — 80069 RENAL FUNCTION PANEL: CPT | Performed by: STUDENT IN AN ORGANIZED HEALTH CARE EDUCATION/TRAINING PROGRAM

## 2024-10-17 PROCEDURE — 80202 ASSAY OF VANCOMYCIN: CPT | Performed by: PHARMACIST

## 2024-10-17 PROCEDURE — 2500000004 HC RX 250 GENERAL PHARMACY W/ HCPCS (ALT 636 FOR OP/ED): Performed by: STUDENT IN AN ORGANIZED HEALTH CARE EDUCATION/TRAINING PROGRAM

## 2024-10-17 PROCEDURE — 83735 ASSAY OF MAGNESIUM: CPT | Performed by: STUDENT IN AN ORGANIZED HEALTH CARE EDUCATION/TRAINING PROGRAM

## 2024-10-17 PROCEDURE — 87070 CULTURE OTHR SPECIMN AEROBIC: CPT | Performed by: INTERNAL MEDICINE

## 2024-10-17 PROCEDURE — 36415 COLL VENOUS BLD VENIPUNCTURE: CPT | Performed by: PHARMACIST

## 2024-10-17 PROCEDURE — 2500000001 HC RX 250 WO HCPCS SELF ADMINISTERED DRUGS (ALT 637 FOR MEDICARE OP)

## 2024-10-17 PROCEDURE — 36415 COLL VENOUS BLD VENIPUNCTURE: CPT | Performed by: STUDENT IN AN ORGANIZED HEALTH CARE EDUCATION/TRAINING PROGRAM

## 2024-10-17 PROCEDURE — 2500000005 HC RX 250 GENERAL PHARMACY W/O HCPCS

## 2024-10-17 RX ORDER — OXYCODONE HYDROCHLORIDE 5 MG/1
10 TABLET ORAL EVERY 6 HOURS PRN
Qty: 24 TABLET | Refills: 0 | Status: ON HOLD | OUTPATIENT
Start: 2024-10-17 | End: 2024-10-21

## 2024-10-17 RX ORDER — DOCUSATE SODIUM 100 MG/1
100 CAPSULE, LIQUID FILLED ORAL 2 TIMES DAILY PRN
Qty: 10 CAPSULE | Refills: 0 | Status: SHIPPED | OUTPATIENT
Start: 2024-10-17 | End: 2024-10-22

## 2024-10-17 RX ORDER — AMOXICILLIN AND CLAVULANATE POTASSIUM 875; 125 MG/1; MG/1
875 TABLET, FILM COATED ORAL 2 TIMES DAILY
Qty: 60 TABLET | Refills: 0 | Status: SHIPPED | OUTPATIENT
Start: 2024-10-17 | End: 2024-11-16

## 2024-10-17 ASSESSMENT — PAIN DESCRIPTION - LOCATION: LOCATION: CHEST

## 2024-10-17 ASSESSMENT — PAIN DESCRIPTION - ORIENTATION: ORIENTATION: RIGHT

## 2024-10-17 ASSESSMENT — PAIN SCALES - GENERAL
PAINLEVEL_OUTOF10: 7
PAINLEVEL_OUTOF10: 3
PAINLEVEL_OUTOF10: 4
PAINLEVEL_OUTOF10: 7
PAINLEVEL_OUTOF10: 7

## 2024-10-17 ASSESSMENT — PAIN - FUNCTIONAL ASSESSMENT
PAIN_FUNCTIONAL_ASSESSMENT: 0-10

## 2024-10-17 NOTE — CARE PLAN
The clinical goals for the shift include Patient's pain will be controlled and rate pain 3 out of 10 or less throughout shift      Problem: Pain - Adult  Goal: Verbalizes/displays adequate comfort level or baseline comfort level  Outcome: Progressing     Problem: Safety - Adult  Goal: Free from fall injury  Outcome: Progressing     Problem: Discharge Planning  Goal: Discharge to home or other facility with appropriate resources  Outcome: Progressing     Problem: Chronic Conditions and Co-morbidities  Goal: Patient's chronic conditions and co-morbidity symptoms are monitored and maintained or improved  Outcome: Progressing     Problem: Pain  Goal: Takes deep breaths with improved pain control throughout the shift  Outcome: Progressing  Goal: Turns in bed with improved pain control throughout the shift  Outcome: Progressing  Goal: Walks with improved pain control throughout the shift  Outcome: Progressing  Goal: Performs ADL's with improved pain control throughout shift  Outcome: Progressing  Goal: Participates in PT with improved pain control throughout the shift  Outcome: Progressing  Goal: Free from opioid side effects throughout the shift  Outcome: Progressing  Goal: Free from acute confusion related to pain meds throughout the shift  Outcome: Progressing

## 2024-10-17 NOTE — PROGRESS NOTES
Vancomycin Dosing by Pharmacy- FOLLOW UP    Lul Lizama is a 49 y.o. year old adult who Pharmacy has been consulted for vancomycin dosing for cellulitis, skin and soft tissue. Based on the patient's indication and renal status this patient is being dosed based on a goal AUC of 400-600.     Renal function is currently declining (Scr fluctuating, monitor closely)     Current vancomycin dose: 1250 mg given every 12 hours    Estimated vancomycin AUC on current dose: 520 mg/L.hr     Visit Vitals  BP (!) 143/91 (BP Location: Right arm, Patient Position: Lying) Comment: pt awaiting pain meds   Pulse 62   Temp 36.6 °C (97.9 °F) (Temporal)   Resp 16        Lab Results   Component Value Date    CREATININE 1.79 (H) 10/17/2024    CREATININE 1.59 (H) 10/16/2024    CREATININE 1.77 (H) 10/15/2024    CREATININE 1.30 10/11/2024        Patient weight is as follows:   Vitals:    10/11/24 2152   Weight: 98.1 kg (216 lb 3.2 oz)       Cultures:  Susceptibility data for the encounter in last 14 days.  Collected Specimen Info Organism   10/13/24 Tissue/Biopsy from Skin/Superficial Abscess Mixed Anaerobic Bacteria        I/O last 3 completed shifts:  In: 1288.3 (13.1 mL/kg) [I.V.:288.3 (2.9 mL/kg); IV Piggyback:1000]  Out: 0 (0 mL/kg)   Weight: 98.1 kg   I/O during current shift:  I/O this shift:  In: 50 [IV Piggyback:50]  Out: -     Temp (24hrs), Av.4 °C (97.5 °F), Min:35.8 °C (96.4 °F), Max:37.3 °C (99.1 °F)      Assessment/Plan    Within goal AUC range. However adjusting dose due to fluctuating renal function to 2g q24h.     This dosing regimen is predicted by [employer] to result in the following pharmacokinetic parameters:  Loading dose: N/A  Regimen: 2000 mg IV every 24 hours.  Start time: 04:07 on 10/18/2024  Exposure target: AUC24 (range)400-600 mg/L.hr   [id number]: 412 mg/L.hr  AUC24,ss: 416 mg/L.hr  Probability of AUC24 > 400: 65 %  Ctrough,ss: 7.9 mg/L  Probability of Ctrough,ss > 20: 0 %      The next level will be obtained  on 10/18 at am labs. May be obtained sooner if clinically indicated.   Will continue to monitor renal function daily while on vancomycin and order serum creatinine at least every 48 hours if not already ordered.  Follow for continued vancomycin needs, clinical response, and signs/symptoms of toxicity.       JOSE PORRAS

## 2024-10-17 NOTE — DISCHARGE SUMMARY
Discharge Diagnosis  Soft tissue infection    Issues Requiring Follow-Up  Hypoglossal nerve stimulator infection- Surgery for removal upcoming    Test Results Pending At Discharge  Pending Labs       Order Current Status    Tissue/Wound Culture/Smear In process            Hospital Course  49 y.o. adult who underwent hypoglossal nerve stimulation placement 2020 now presenting with acute erythema and pain of his right chest incision and implant site. He was admitted on 10/11 and had been on IV antibiotics with some improvement. He is scheduled for surgery to remove the device on an outpatient basis on 10/21 with Dr. Sims. ID evaluated the patient and made recommendations for antibiotics while in house and post discharge. On the day of discharge, the pt was tolerating a diet, pain was controlled on PO pain medication, and they were ambulating and voiding spontaneously. They were discharged gome in stable condition with instructions to follow up as outpatient for surgery.     Pertinent Physical Exam At Time of Discharge  Physical Exam  Vitals reviewed.     GEN: The patient appears stated age in no acute distress  VOICE: No dysphonia  RESP: Unlabored on room air with no audible stertor or stridor  CV: Clinically well perfused   EYES: EOM grossly intact with no scleral icterus  NEURO: Alert and oriented   HEAD: Scalp is normocephalic and atraumatic  EARS: Normal external ears  NOSE: External nose appears normal  OC: MMM  NECK: Trachea midlin  Chest: Well healed scar over right chest, area approximately 3x4cm erythematous with surrounding induration, superficial fluctuance.  Site is tender to palpation.     Home Medications     Medication List      START taking these medications     amoxicillin-pot clavulanate 875-125 mg tablet; Commonly known as:   Augmentin; Take 1 tablet (875 mg) by mouth 2 times a day.   docusate sodium 100 mg capsule; Commonly known as: Colace; Take 1   capsule (100 mg) by mouth 2 times a day as  needed for constipation for up   to 5 days.   oxyCODONE 5 mg immediate release tablet; Commonly known as: Roxicodone;   Take 2 tablets (10 mg) by mouth every 6 hours if needed for severe pain (7   - 10) for up to 3 days.     CONTINUE taking these medications     Vitamin D3 5,000 Units tablet; Generic drug: cholecalciferol     ASK your doctor about these medications     pantoprazole 40 mg EC tablet; Commonly known as: ProtoNix; Take 1 tablet   (40 mg) by mouth once daily in the morning. Take before meals. Do not   crush, chew, or split.       Outpatient Follow-Up  No future appointments.    Nehal Abernathy MD

## 2024-10-17 NOTE — DISCHARGE INSTRUCTIONS
DISCHARGE INSTRUCTIONS  Physicians:               Attending: Levi    Treatment/wound care:   Keep area(s) clean and dry.   Do not scrub wound(s), pat dry.    Do not submerge wound(s) in standing water until seen in followup (no tub bathing, swimming, or hot tubs).    Please visually inspect your wound(s) at least once daily.  If the wound(s) are in a difficult to see location, please use a mirror or have someone else assist with visual inspection.    Pain Control:    Tylenol and ibuprofen  Oxycodone as needed for severe pain    Activity after Discharge:   No heavy lifting, weight bearing as tolerated, no driving until mobility is returned to normal.   Do not submerge wound(s) in standing water for 7 days after surgery (no tub bathing, swimming, or hot tubs).   Do not lift, push or pull more than 10 pounds    Diet: regular    Follow-Up:   - You are scheduled for surgery on 10/21/2024 with Dr. Sims.  -If you have experience fevers, chills, expanding redness, or tenderness please return to the ED sooner

## 2024-10-18 NOTE — PROGRESS NOTES
Subjective   Interval History:          Patient seen during midday rounds.  Patient's wife present  Patient's wife left the room for exam  Patient has had no fever, chills, headache, nausea, vomiting or diarrhea  Patient has noted slight increase in soiling of the dressing over the right upper chest abscess overlying the adjacent hypoglossal nerve stimulator device        Objective   Range of Vitals (last 24 hours)  Heart Rate:  [57-62]   Temp:  [35.8 °C (96.4 °F)-36.6 °C (97.9 °F)]   Resp:  [16]   BP: (135-143)/(80-91)   SpO2:  [97 %-98 %]   Daily Weight  10/11/24 : 98.1 kg (216 lb 3.2 oz)    Body mass index is 32.87 kg/m².    Physical Exam  Small sort of blister area containing some pus over previous needle aspirate hole.  This layer of epidermis sloughed without any significant trauma.  The area was cleansed with alcohol swab and palpated.  Pain with palpation is moderate  Device is mobile. Significant subcutaneous fluid  1 to 2 drops of exudate was palpated and expressed out of  pinhole size defects.  I did not push hard enough to get copious drainage.  This was rather tender.  Lungs clear  S1, S2, I did not hear murmur  No peripheral rash      Antibiotics  amoxicillin-pot clavulanate - 875-125 mg    Relevant Results  Labs  Results from last 72 hours   Lab Units 10/17/24  0543 10/16/24  0615 10/15/24  0528   WBC AUTO x10*3/uL 4.8 6.0 5.4   HEMOGLOBIN g/dL 14.5 14.5 13.9   HEMATOCRIT % 42.1 41.7 40.3   PLATELETS AUTO x10*3/uL 154 167 145*     Results from last 72 hours   Lab Units 10/17/24  0543 10/16/24  0615 10/15/24  0528   SODIUM mmol/L 140 139 139   POTASSIUM mmol/L 3.8 3.9 4.1   CHLORIDE mmol/L 103 103 102   CO2 mmol/L 31 27 31   BUN mg/dL 18 15 13   CREATININE mg/dL 1.79* 1.59* 1.77*   GLUCOSE mg/dL 112* 94 116*   CALCIUM mg/dL 9.1 9.1 9.2   ANION GAP mmol/L 10 13 10   EGFR mL/min/1.73m*2 46* 53* 47*   PHOSPHORUS mg/dL 4.3 3.6 3.7     Results from last 72 hours   Lab Units 10/17/24  0543 10/16/24  0615  "10/15/24  0528   ALBUMIN g/dL 4.0 4.0 3.9     Estimated Creatinine Clearance (by C-G formula based on SCr of 1.79 mg/dL (H))  Female: 46.6 mL/min (A)  Male: 56.7 mL/min (A)  The patient&#39;s sex/gender information is inconclusive; review their information before proceeding.  No results found for: \"CRP\"  Microbiology  Susceptibility data from last 14 days.  Collected Specimen Info Organism   10/13/24 Tissue/Biopsy from Skin/Superficial Abscess Mixed Anaerobic Bacteria       Assessment/Plan          49 y.o. male presenting with acute erythema, fluctuance, and pain at hypoglossal nerve stimulator implant site consistent with soft tissue infection. Patient remains hemodynamically stable, afebrile, and WBC wnl along with exam suggests localized infection. Aspirate culture is likely to grow Staph aureus or other skin sobia, but will follow culture results to direct therapy.        Definitive treatment will involve removal of the device and a course of directed antibiotics to address soft tissue infection. Etiology of this infection is unlikely due to hematologic spread of bacteria due to lack of any systemic symptoms or risk factors for bacteremia.     #Right chest wall soft tissue infection  - 10/11: Started on vancomycin and zosyn     - 10/13: Aspiration culture sent and shows ONLY mixed gram-negative anaerobic rods    - ENT plan for device removal has been planned but post opened     - Continue vancomycin and zosyn for broad spectrum coverage while awaiting final aspiration cultures and device removal.    - Follow up 10/13/2024 aspiration culture for directed therapy    -Follow-up 10/17/2020 culture of spontaneous draining pus, small amount.    - Available are available for add-on case.  Patient stable and could take oral Augmentin 875 mg p.o. twice daily at home and return for elective outpatient device removal.      - After device removal would recommend 2-week course of Augmentin    Discussed with primary " ENT  Discussed with patient and his wife  Patient aware that he should return to the emergency department if he develops additional swelling, additional painful drainage, additional redness or fever and chills for example             Daniel Madden MD

## 2024-10-19 LAB
BACTERIA SPEC CULT: NORMAL
GRAM STN SPEC: NORMAL
GRAM STN SPEC: NORMAL

## 2024-10-21 ENCOUNTER — HOSPITAL ENCOUNTER (INPATIENT)
Facility: HOSPITAL | Age: 49
LOS: 1 days | Discharge: HOME | End: 2024-10-22
Payer: COMMERCIAL

## 2024-10-21 DIAGNOSIS — L08.9 SOFT TISSUE INFECTION: ICD-10-CM

## 2024-10-21 DIAGNOSIS — G89.18 POST-OP PAIN: Primary | ICD-10-CM

## 2024-10-21 PROBLEM — L03.313 CELLULITIS OF CHEST WALL: Status: ACTIVE | Noted: 2024-10-21

## 2024-10-21 LAB
ABO GROUP (TYPE) IN BLOOD: NORMAL
ANTIBODY SCREEN: NORMAL
RH FACTOR (ANTIGEN D): NORMAL

## 2024-10-21 PROCEDURE — A20100 PR EXPLORE WOUND,NECK: Performed by: NURSE ANESTHETIST, CERTIFIED REGISTERED

## 2024-10-21 PROCEDURE — 3600000007 HC OR TIME - EACH INCREMENTAL 1 MINUTE - PROCEDURE LEVEL TWO

## 2024-10-21 PROCEDURE — 36415 COLL VENOUS BLD VENIPUNCTURE: CPT | Performed by: ANESTHESIOLOGY

## 2024-10-21 PROCEDURE — 3700000001 HC GENERAL ANESTHESIA TIME - INITIAL BASE CHARGE

## 2024-10-21 PROCEDURE — 1170000001 HC PRIVATE ONCOLOGY ROOM DAILY

## 2024-10-21 PROCEDURE — 2500000004 HC RX 250 GENERAL PHARMACY W/ HCPCS (ALT 636 FOR OP/ED): Performed by: ANESTHESIOLOGY

## 2024-10-21 PROCEDURE — 2720000007 HC OR 272 NO HCPCS

## 2024-10-21 PROCEDURE — 87102 FUNGUS ISOLATION CULTURE: CPT

## 2024-10-21 PROCEDURE — 87070 CULTURE OTHR SPECIMN AEROBIC: CPT

## 2024-10-21 PROCEDURE — 0JPT0MZ REMOVAL OF STIMULATOR GENERATOR FROM TRUNK SUBCUTANEOUS TISSUE AND FASCIA, OPEN APPROACH: ICD-10-PCS

## 2024-10-21 PROCEDURE — 2500000004 HC RX 250 GENERAL PHARMACY W/ HCPCS (ALT 636 FOR OP/ED)

## 2024-10-21 PROCEDURE — 2500000001 HC RX 250 WO HCPCS SELF ADMINISTERED DRUGS (ALT 637 FOR MEDICARE OP)

## 2024-10-21 PROCEDURE — 2500000005 HC RX 250 GENERAL PHARMACY W/O HCPCS: Performed by: ANESTHESIOLOGY

## 2024-10-21 PROCEDURE — 86850 RBC ANTIBODY SCREEN: CPT | Performed by: ANESTHESIOLOGY

## 2024-10-21 PROCEDURE — A20100 PR EXPLORE WOUND,NECK: Performed by: ANESTHESIOLOGY

## 2024-10-21 PROCEDURE — 3600000002 HC OR TIME - INITIAL BASE CHARGE - PROCEDURE LEVEL TWO

## 2024-10-21 PROCEDURE — 64584 RMVL HPGLSL NSTIM ARY PG: CPT

## 2024-10-21 PROCEDURE — 0KPX0MZ REMOVAL OF STIMULATOR LEAD FROM UPPER MUSCLE, OPEN APPROACH: ICD-10-PCS

## 2024-10-21 PROCEDURE — 2500000005 HC RX 250 GENERAL PHARMACY W/O HCPCS

## 2024-10-21 PROCEDURE — 3700000002 HC GENERAL ANESTHESIA TIME - EACH INCREMENTAL 1 MINUTE

## 2024-10-21 PROCEDURE — 7100000002 HC RECOVERY ROOM TIME - EACH INCREMENTAL 1 MINUTE

## 2024-10-21 PROCEDURE — 7100000001 HC RECOVERY ROOM TIME - INITIAL BASE CHARGE

## 2024-10-21 PROCEDURE — 2500000001 HC RX 250 WO HCPCS SELF ADMINISTERED DRUGS (ALT 637 FOR MEDICARE OP): Performed by: ANESTHESIOLOGY

## 2024-10-21 RX ORDER — SODIUM CHLORIDE, SODIUM LACTATE, POTASSIUM CHLORIDE, CALCIUM CHLORIDE 600; 310; 30; 20 MG/100ML; MG/100ML; MG/100ML; MG/100ML
100 INJECTION, SOLUTION INTRAVENOUS CONTINUOUS
Status: DISCONTINUED | OUTPATIENT
Start: 2024-10-21 | End: 2024-10-21 | Stop reason: HOSPADM

## 2024-10-21 RX ORDER — ENOXAPARIN SODIUM 100 MG/ML
40 INJECTION SUBCUTANEOUS EVERY 24 HOURS
Status: DISCONTINUED | OUTPATIENT
Start: 2024-10-21 | End: 2024-10-22 | Stop reason: HOSPADM

## 2024-10-21 RX ORDER — CHOLECALCIFEROL (VITAMIN D3) 25 MCG
5000 TABLET ORAL DAILY
Status: DISCONTINUED | OUTPATIENT
Start: 2024-10-21 | End: 2024-10-22 | Stop reason: HOSPADM

## 2024-10-21 RX ORDER — ONDANSETRON 4 MG/1
4 TABLET, ORALLY DISINTEGRATING ORAL EVERY 8 HOURS PRN
Status: DISCONTINUED | OUTPATIENT
Start: 2024-10-21 | End: 2024-10-22 | Stop reason: HOSPADM

## 2024-10-21 RX ORDER — ESMOLOL HYDROCHLORIDE 10 MG/ML
INJECTION INTRAVENOUS AS NEEDED
Status: DISCONTINUED | OUTPATIENT
Start: 2024-10-21 | End: 2024-10-21

## 2024-10-21 RX ORDER — OXYCODONE HYDROCHLORIDE 5 MG/1
10 TABLET ORAL EVERY 6 HOURS PRN
Qty: 24 TABLET | Refills: 0 | Status: SHIPPED | OUTPATIENT
Start: 2024-10-21

## 2024-10-21 RX ORDER — AMOXICILLIN AND CLAVULANATE POTASSIUM 875; 125 MG/1; MG/1
875 TABLET, FILM COATED ORAL 2 TIMES DAILY
Status: DISCONTINUED | OUTPATIENT
Start: 2024-10-21 | End: 2024-10-22 | Stop reason: HOSPADM

## 2024-10-21 RX ORDER — HYDROMORPHONE HYDROCHLORIDE 1 MG/ML
0.2 INJECTION, SOLUTION INTRAMUSCULAR; INTRAVENOUS; SUBCUTANEOUS EVERY 5 MIN PRN
Status: DISCONTINUED | OUTPATIENT
Start: 2024-10-21 | End: 2024-10-21 | Stop reason: HOSPADM

## 2024-10-21 RX ORDER — OXYCODONE HYDROCHLORIDE 5 MG/1
5 TABLET ORAL EVERY 4 HOURS PRN
Status: DISCONTINUED | OUTPATIENT
Start: 2024-10-21 | End: 2024-10-21 | Stop reason: HOSPADM

## 2024-10-21 RX ORDER — OXYCODONE HYDROCHLORIDE 5 MG/1
5 TABLET ORAL EVERY 6 HOURS PRN
Status: DISCONTINUED | OUTPATIENT
Start: 2024-10-21 | End: 2024-10-22 | Stop reason: HOSPADM

## 2024-10-21 RX ORDER — BACITRACIN 500 [USP'U]/G
OINTMENT TOPICAL AS NEEDED
Status: DISCONTINUED | OUTPATIENT
Start: 2024-10-21 | End: 2024-10-21 | Stop reason: HOSPADM

## 2024-10-21 RX ORDER — DOCUSATE SODIUM 100 MG/1
100 CAPSULE, LIQUID FILLED ORAL 2 TIMES DAILY PRN
Status: DISCONTINUED | OUTPATIENT
Start: 2024-10-21 | End: 2024-10-22 | Stop reason: HOSPADM

## 2024-10-21 RX ORDER — LIDOCAINE HYDROCHLORIDE AND EPINEPHRINE 10; 10 MG/ML; UG/ML
INJECTION, SOLUTION INFILTRATION; PERINEURAL AS NEEDED
Status: DISCONTINUED | OUTPATIENT
Start: 2024-10-21 | End: 2024-10-21 | Stop reason: HOSPADM

## 2024-10-21 RX ORDER — ACETAMINOPHEN 160 MG/5ML
1000 SOLUTION ORAL EVERY 8 HOURS SCHEDULED
Status: DISCONTINUED | OUTPATIENT
Start: 2024-10-21 | End: 2024-10-22 | Stop reason: HOSPADM

## 2024-10-21 RX ORDER — HYDROMORPHONE HYDROCHLORIDE 1 MG/ML
INJECTION, SOLUTION INTRAMUSCULAR; INTRAVENOUS; SUBCUTANEOUS AS NEEDED
Status: DISCONTINUED | OUTPATIENT
Start: 2024-10-21 | End: 2024-10-21

## 2024-10-21 RX ORDER — CEFAZOLIN 1 G/1
INJECTION, POWDER, FOR SOLUTION INTRAVENOUS AS NEEDED
Status: DISCONTINUED | OUTPATIENT
Start: 2024-10-21 | End: 2024-10-21

## 2024-10-21 RX ORDER — HYDROMORPHONE HYDROCHLORIDE 1 MG/ML
0.5 INJECTION, SOLUTION INTRAMUSCULAR; INTRAVENOUS; SUBCUTANEOUS EVERY 5 MIN PRN
Status: DISCONTINUED | OUTPATIENT
Start: 2024-10-21 | End: 2024-10-21 | Stop reason: HOSPADM

## 2024-10-21 RX ORDER — ACETAMINOPHEN 325 MG/1
975 TABLET ORAL EVERY 8 HOURS SCHEDULED
Status: DISCONTINUED | OUTPATIENT
Start: 2024-10-21 | End: 2024-10-22 | Stop reason: HOSPADM

## 2024-10-21 RX ORDER — PROPOFOL 10 MG/ML
INJECTION, EMULSION INTRAVENOUS AS NEEDED
Status: DISCONTINUED | OUTPATIENT
Start: 2024-10-21 | End: 2024-10-21

## 2024-10-21 RX ORDER — ONDANSETRON HYDROCHLORIDE 2 MG/ML
4 INJECTION, SOLUTION INTRAVENOUS ONCE AS NEEDED
Status: DISCONTINUED | OUTPATIENT
Start: 2024-10-21 | End: 2024-10-21 | Stop reason: HOSPADM

## 2024-10-21 RX ORDER — ONDANSETRON HYDROCHLORIDE 2 MG/ML
4 INJECTION, SOLUTION INTRAVENOUS EVERY 8 HOURS PRN
Status: DISCONTINUED | OUTPATIENT
Start: 2024-10-21 | End: 2024-10-22 | Stop reason: HOSPADM

## 2024-10-21 RX ORDER — SODIUM CHLORIDE, SODIUM LACTATE, POTASSIUM CHLORIDE, CALCIUM CHLORIDE 600; 310; 30; 20 MG/100ML; MG/100ML; MG/100ML; MG/100ML
INJECTION, SOLUTION INTRAVENOUS CONTINUOUS PRN
Status: DISCONTINUED | OUTPATIENT
Start: 2024-10-21 | End: 2024-10-21

## 2024-10-21 RX ORDER — OXYCODONE HYDROCHLORIDE 5 MG/1
5 TABLET ORAL ONCE
Status: COMPLETED | OUTPATIENT
Start: 2024-10-21 | End: 2024-10-21

## 2024-10-21 RX ORDER — METHOCARBAMOL 100 MG/ML
1000 INJECTION, SOLUTION INTRAMUSCULAR; INTRAVENOUS ONCE
Status: COMPLETED | OUTPATIENT
Start: 2024-10-21 | End: 2024-10-21

## 2024-10-21 RX ORDER — ACETAMINOPHEN 325 MG/1
650 TABLET ORAL EVERY 4 HOURS PRN
Status: DISCONTINUED | OUTPATIENT
Start: 2024-10-21 | End: 2024-10-21 | Stop reason: HOSPADM

## 2024-10-21 RX ORDER — PANTOPRAZOLE SODIUM 40 MG/1
40 TABLET, DELAYED RELEASE ORAL
Status: DISCONTINUED | OUTPATIENT
Start: 2024-10-22 | End: 2024-10-22 | Stop reason: HOSPADM

## 2024-10-21 RX ORDER — LIDOCAINE HYDROCHLORIDE 10 MG/ML
0.1 INJECTION, SOLUTION INFILTRATION; PERINEURAL ONCE
Status: DISCONTINUED | OUTPATIENT
Start: 2024-10-21 | End: 2024-10-21 | Stop reason: HOSPADM

## 2024-10-21 RX ORDER — MIDAZOLAM HYDROCHLORIDE 1 MG/ML
INJECTION INTRAMUSCULAR; INTRAVENOUS AS NEEDED
Status: DISCONTINUED | OUTPATIENT
Start: 2024-10-21 | End: 2024-10-21

## 2024-10-21 RX ORDER — ONDANSETRON HYDROCHLORIDE 2 MG/ML
INJECTION, SOLUTION INTRAVENOUS AS NEEDED
Status: DISCONTINUED | OUTPATIENT
Start: 2024-10-21 | End: 2024-10-21

## 2024-10-21 RX ORDER — NALOXONE HYDROCHLORIDE 0.4 MG/ML
0.2 INJECTION, SOLUTION INTRAMUSCULAR; INTRAVENOUS; SUBCUTANEOUS EVERY 5 MIN PRN
Status: DISCONTINUED | OUTPATIENT
Start: 2024-10-21 | End: 2024-10-22 | Stop reason: HOSPADM

## 2024-10-21 RX ORDER — LIDOCAINE HCL/PF 100 MG/5ML
SYRINGE (ML) INTRAVENOUS AS NEEDED
Status: DISCONTINUED | OUTPATIENT
Start: 2024-10-21 | End: 2024-10-21

## 2024-10-21 RX ORDER — SODIUM CHLORIDE 0.9 G/100ML
IRRIGANT IRRIGATION AS NEEDED
Status: DISCONTINUED | OUTPATIENT
Start: 2024-10-21 | End: 2024-10-21 | Stop reason: HOSPADM

## 2024-10-21 RX ORDER — OXYCODONE HYDROCHLORIDE 5 MG/1
10 TABLET ORAL EVERY 6 HOURS PRN
Status: DISCONTINUED | OUTPATIENT
Start: 2024-10-21 | End: 2024-10-22 | Stop reason: HOSPADM

## 2024-10-21 RX ORDER — ROCURONIUM BROMIDE 10 MG/ML
INJECTION, SOLUTION INTRAVENOUS AS NEEDED
Status: DISCONTINUED | OUTPATIENT
Start: 2024-10-21 | End: 2024-10-21

## 2024-10-21 RX ADMIN — ACETAMINOPHEN 975 MG: 325 TABLET ORAL at 21:33

## 2024-10-21 RX ADMIN — HYDROMORPHONE HYDROCHLORIDE 0.5 MG: 1 INJECTION, SOLUTION INTRAMUSCULAR; INTRAVENOUS; SUBCUTANEOUS at 11:49

## 2024-10-21 RX ADMIN — Medication 1 L/MIN: at 11:15

## 2024-10-21 RX ADMIN — HYDROMORPHONE HYDROCHLORIDE 0.5 MG: 1 INJECTION, SOLUTION INTRAMUSCULAR; INTRAVENOUS; SUBCUTANEOUS at 11:37

## 2024-10-21 RX ADMIN — HYDROMORPHONE HYDROCHLORIDE 0.5 MG: 1 INJECTION, SOLUTION INTRAMUSCULAR; INTRAVENOUS; SUBCUTANEOUS at 11:30

## 2024-10-21 RX ADMIN — OXYCODONE HYDROCHLORIDE 10 MG: 5 TABLET ORAL at 21:33

## 2024-10-21 RX ADMIN — ACETAMINOPHEN 975 MG: 325 TABLET ORAL at 14:29

## 2024-10-21 RX ADMIN — OXYCODONE HYDROCHLORIDE 10 MG: 5 TABLET ORAL at 15:52

## 2024-10-21 RX ADMIN — METHOCARBAMOL 1000 MG: 100 INJECTION INTRAMUSCULAR; INTRAVENOUS at 13:02

## 2024-10-21 RX ADMIN — AMOXICILLIN AND CLAVULANATE POTASSIUM 875 MG: 875; 125 TABLET, FILM COATED ORAL at 12:39

## 2024-10-21 RX ADMIN — OXYCODONE HYDROCHLORIDE 5 MG: 5 TABLET ORAL at 11:52

## 2024-10-21 RX ADMIN — HYDROMORPHONE HYDROCHLORIDE 0.5 MG: 1 INJECTION, SOLUTION INTRAMUSCULAR; INTRAVENOUS; SUBCUTANEOUS at 11:43

## 2024-10-21 RX ADMIN — AMOXICILLIN AND CLAVULANATE POTASSIUM 875 MG: 875; 125 TABLET, FILM COATED ORAL at 21:33

## 2024-10-21 RX ADMIN — OXYCODONE HYDROCHLORIDE 5 MG: 5 TABLET ORAL at 12:32

## 2024-10-21 RX ADMIN — ENOXAPARIN SODIUM 40 MG: 100 INJECTION SUBCUTANEOUS at 15:47

## 2024-10-21 SDOH — SOCIAL STABILITY: SOCIAL INSECURITY: WITHIN THE LAST YEAR, HAVE YOU BEEN HUMILIATED OR EMOTIONALLY ABUSED IN OTHER WAYS BY YOUR PARTNER OR EX-PARTNER?: NO

## 2024-10-21 SDOH — SOCIAL STABILITY: SOCIAL INSECURITY: DOES ANYONE TRY TO KEEP YOU FROM HAVING/CONTACTING OTHER FRIENDS OR DOING THINGS OUTSIDE YOUR HOME?: NO

## 2024-10-21 SDOH — ECONOMIC STABILITY: HOUSING INSECURITY: AT ANY TIME IN THE PAST 12 MONTHS, WERE YOU HOMELESS OR LIVING IN A SHELTER (INCLUDING NOW)?: PATIENT DECLINED

## 2024-10-21 SDOH — HEALTH STABILITY: MENTAL HEALTH
DO YOU FEEL STRESS - TENSE, RESTLESS, NERVOUS, OR ANXIOUS, OR UNABLE TO SLEEP AT NIGHT BECAUSE YOUR MIND IS TROUBLED ALL THE TIME - THESE DAYS?: PATIENT DECLINED

## 2024-10-21 SDOH — SOCIAL STABILITY: SOCIAL INSECURITY: HAS ANYONE EVER THREATENED TO HURT YOUR FAMILY OR YOUR PETS?: NO

## 2024-10-21 SDOH — ECONOMIC STABILITY: TRANSPORTATION INSECURITY
IN THE PAST 12 MONTHS, HAS LACK OF TRANSPORTATION KEPT YOU FROM MEDICAL APPOINTMENTS OR FROM GETTING MEDICATIONS?: PATIENT DECLINED

## 2024-10-21 SDOH — SOCIAL STABILITY: SOCIAL INSECURITY: ARE YOU OR HAVE YOU BEEN THREATENED OR ABUSED PHYSICALLY, EMOTIONALLY, OR SEXUALLY BY ANYONE?: NO

## 2024-10-21 SDOH — ECONOMIC STABILITY: HOUSING INSECURITY: IN THE LAST 12 MONTHS, WAS THERE A TIME WHEN YOU WERE NOT ABLE TO PAY THE MORTGAGE OR RENT ON TIME?: PATIENT DECLINED

## 2024-10-21 SDOH — SOCIAL STABILITY: SOCIAL INSECURITY: WITHIN THE LAST YEAR, HAVE YOU BEEN AFRAID OF YOUR PARTNER OR EX-PARTNER?: NO

## 2024-10-21 SDOH — ECONOMIC STABILITY: FOOD INSECURITY
WITHIN THE PAST 12 MONTHS, YOU WORRIED THAT YOUR FOOD WOULD RUN OUT BEFORE YOU GOT THE MONEY TO BUY MORE.: PATIENT DECLINED

## 2024-10-21 SDOH — SOCIAL STABILITY: SOCIAL INSECURITY: HAVE YOU HAD ANY THOUGHTS OF HARMING ANYONE ELSE?: NO

## 2024-10-21 SDOH — SOCIAL STABILITY: SOCIAL INSECURITY: ABUSE: ADULT

## 2024-10-21 SDOH — SOCIAL STABILITY: SOCIAL INSECURITY: HAVE YOU HAD THOUGHTS OF HARMING ANYONE ELSE?: NO

## 2024-10-21 SDOH — SOCIAL STABILITY: SOCIAL INSECURITY: DO YOU FEEL ANYONE HAS EXPLOITED OR TAKEN ADVANTAGE OF YOU FINANCIALLY OR OF YOUR PERSONAL PROPERTY?: NO

## 2024-10-21 SDOH — SOCIAL STABILITY: SOCIAL INSECURITY: ARE THERE ANY APPARENT SIGNS OF INJURIES/BEHAVIORS THAT COULD BE RELATED TO ABUSE/NEGLECT?: NO

## 2024-10-21 SDOH — SOCIAL STABILITY: SOCIAL INSECURITY: WERE YOU ABLE TO COMPLETE ALL THE BEHAVIORAL HEALTH SCREENINGS?: YES

## 2024-10-21 SDOH — ECONOMIC STABILITY: FOOD INSECURITY: WITHIN THE PAST 12 MONTHS, THE FOOD YOU BOUGHT JUST DIDN'T LAST AND YOU DIDN'T HAVE MONEY TO GET MORE.: PATIENT DECLINED

## 2024-10-21 SDOH — ECONOMIC STABILITY: INCOME INSECURITY
IN THE PAST 12 MONTHS HAS THE ELECTRIC, GAS, OIL, OR WATER COMPANY THREATENED TO SHUT OFF SERVICES IN YOUR HOME?: PATIENT DECLINED

## 2024-10-21 SDOH — ECONOMIC STABILITY: FOOD INSECURITY: HOW HARD IS IT FOR YOU TO PAY FOR THE VERY BASICS LIKE FOOD, HOUSING, MEDICAL CARE, AND HEATING?: PATIENT DECLINED

## 2024-10-21 SDOH — SOCIAL STABILITY: SOCIAL INSECURITY: DO YOU FEEL UNSAFE GOING BACK TO THE PLACE WHERE YOU ARE LIVING?: NO

## 2024-10-21 ASSESSMENT — PAIN - FUNCTIONAL ASSESSMENT
PAIN_FUNCTIONAL_ASSESSMENT: 0-10
PAIN_FUNCTIONAL_ASSESSMENT: UNABLE TO SELF-REPORT
PAIN_FUNCTIONAL_ASSESSMENT: 0-10

## 2024-10-21 ASSESSMENT — ACTIVITIES OF DAILY LIVING (ADL)
JUDGMENT_ADEQUATE_SAFELY_COMPLETE_DAILY_ACTIVITIES: YES
ADEQUATE_TO_COMPLETE_ADL: YES
BATHING: INDEPENDENT
PATIENT'S MEMORY ADEQUATE TO SAFELY COMPLETE DAILY ACTIVITIES?: YES
WALKS IN HOME: INDEPENDENT
TOILETING: INDEPENDENT
WALKS IN HOME: INDEPENDENT
FEEDING YOURSELF: INDEPENDENT
GROOMING: INDEPENDENT
BATHING: INDEPENDENT
GROOMING: INDEPENDENT
ADEQUATE_TO_COMPLETE_ADL: YES
FEEDING YOURSELF: INDEPENDENT
PATIENT'S MEMORY ADEQUATE TO SAFELY COMPLETE DAILY ACTIVITIES?: YES
HEARING - RIGHT EAR: FUNCTIONAL
JUDGMENT_ADEQUATE_SAFELY_COMPLETE_DAILY_ACTIVITIES: YES
TOILETING: INDEPENDENT
HEARING - LEFT EAR: FUNCTIONAL
LACK_OF_TRANSPORTATION: PATIENT DECLINED
LACK_OF_TRANSPORTATION: NO
HEARING - LEFT EAR: FUNCTIONAL
HEARING - RIGHT EAR: FUNCTIONAL
DRESSING YOURSELF: INDEPENDENT
DRESSING YOURSELF: INDEPENDENT

## 2024-10-21 ASSESSMENT — PAIN SCALES - GENERAL
PAINLEVEL_OUTOF10: 8
PAINLEVEL_OUTOF10: 7
PAINLEVEL_OUTOF10: 8
PAINLEVEL_OUTOF10: 8
PAINLEVEL_OUTOF10: 0 - NO PAIN
PAINLEVEL_OUTOF10: 8
PAINLEVEL_OUTOF10: 6
PAINLEVEL_OUTOF10: 6
PAINLEVEL_OUTOF10: 8
PAINLEVEL_OUTOF10: 6
PAINLEVEL_OUTOF10: 6
PAINLEVEL_OUTOF10: 8

## 2024-10-21 ASSESSMENT — LIFESTYLE VARIABLES
HOW MANY STANDARD DRINKS CONTAINING ALCOHOL DO YOU HAVE ON A TYPICAL DAY: PATIENT DECLINED
HOW OFTEN DO YOU HAVE 6 OR MORE DRINKS ON ONE OCCASION: PATIENT DECLINED
AUDIT-C TOTAL SCORE: -1
SKIP TO QUESTIONS 9-10: 0
AUDIT-C TOTAL SCORE: -1
HOW OFTEN DO YOU HAVE A DRINK CONTAINING ALCOHOL: PATIENT DECLINED

## 2024-10-21 ASSESSMENT — COGNITIVE AND FUNCTIONAL STATUS - GENERAL
DAILY ACTIVITIY SCORE: 24
MOBILITY SCORE: 24
PATIENT BASELINE BEDBOUND: NO

## 2024-10-21 ASSESSMENT — COLUMBIA-SUICIDE SEVERITY RATING SCALE - C-SSRS
6. HAVE YOU EVER DONE ANYTHING, STARTED TO DO ANYTHING, OR PREPARED TO DO ANYTHING TO END YOUR LIFE?: NO
1. IN THE PAST MONTH, HAVE YOU WISHED YOU WERE DEAD OR WISHED YOU COULD GO TO SLEEP AND NOT WAKE UP?: NO
2. HAVE YOU ACTUALLY HAD ANY THOUGHTS OF KILLING YOURSELF?: NO

## 2024-10-21 ASSESSMENT — PATIENT HEALTH QUESTIONNAIRE - PHQ9
SUM OF ALL RESPONSES TO PHQ9 QUESTIONS 1 & 2: 0
2. FEELING DOWN, DEPRESSED OR HOPELESS: NOT AT ALL
1. LITTLE INTEREST OR PLEASURE IN DOING THINGS: NOT AT ALL

## 2024-10-21 NOTE — BRIEF OP NOTE
Date: 10/21/2024  OR Location: [facility] OR    Name: Lul Lizama, : 1975, Age: 49 y.o., MRN: [id number], Sex: adult    Diagnosis  Pre-op Diagnosis      * Soft tissue infection [L08.9] Post-op Diagnosis     * Soft tissue infection [L08.9]     Procedures  Exploration Of Right Neck & Chest, Explantation Of Inspire Device  37032 - DC OPEN IMPLTJ HPGLSL NRV NSTIM RA PG&RESPIR SENSOR      Surgeons      * Laureano Skinner - Primary    Resident/Fellow/Other Assistant:  Surgeons and Role:     * Emeterio Robertson MD - Resident - Assisting    Procedure Summary  Anesthesia: General  ASA: II  Anesthesia Staff: Anesthesiologist: Annie Dalton MD  CRNA: BERT Deutsch-CRNA  SRNA: Lilia Cosme  Estimated Blood Loss: 5mL  Intra-op Medications:   Administrations occurring from 0645 to 1030 on 10/21/24:   Medication Name Total Dose   lidocaine-epinephrine (Xylocaine W/EPI) 1 %-1:100,000 injection 10 mL   sodium chloride 0.9 % irrigation solution 1,000 mL   ceFAZolin (Ancef) vial 1 g 2 g   dexAMETHasone (Decadron) injection 4 mg/mL 8 mg   esmolol (Brevibloc) injection 50 mg   lactated Ringer's infusion Cannot be calculated   lidocaine (cardiac) injection 2% prefilled syringe 100 mg   midazolam PF (Versed) injection 1 mg/mL 2 mg   propofol (Diprivan) injection 10 mg/mL 200 mg   remifentanil (Ultiva) 1,000 mcg in lactated Ringer's 50 mL (20 mcg/mL) infusion 1.56 mg   rocuronium (ZeMuron) 50 mg/5 mL injection 40 mg              Anesthesia Record               Intraprocedure I/O Totals          Intake    Remifentanil Drip 0.00 mL    The total shown is the total volume documented since Anesthesia Start was filed.    Total Intake 0 mL          Specimen:   ID Type Source Tests Collected by Time   A : RIGHT CHEST Swab SOFT TISSUE BIOPSY FUNGAL CULTURE/SMEAR, TISSUE/WOUND CULTURE/SMEAR Laureano Skinner MD 10/21/2024 0845        Staff:   Circulator: Danelle Elizabethub Person: Slime Coffman Person: Lm Wood  Scrub: Leigha          Findings: Over the chest site at the battery there is a superficial area of purulence down to the subdermal fat.  Dissecting further there appear to be no gross infection surrounding the battery or the leads.  All components removed.    Complications:  None; patient tolerated the procedure well.     Disposition: PACU - hemodynamically stable.  Condition: stable  Specimens Collected:   ID Type Source Tests Collected by Time   A : RIGHT CHEST Swab SOFT TISSUE BIOPSY FUNGAL CULTURE/SMEAR, TISSUE/WOUND CULTURE/SMEAR Laureano Skinner MD 10/21/2024 2578     Attending Attestation:     Laureano Skinner  Phone Number: [phone]

## 2024-10-21 NOTE — ANESTHESIA PROCEDURE NOTES
Airway  Date/Time: 10/21/2024 7:41 AM  Urgency: elective    Airway not difficult    Staffing  Performed: SRNA   Authorized by: Annie Dalton MD    Performed by: Lilia Cosme  Patient location during procedure: OR    Indications and Patient Condition  Indications for airway management: anesthesia  Spontaneous Ventilation: absent  Sedation level: deep  Preoxygenated: yes  Patient position: sniffing  Mask difficulty assessment: 2 - vent by mask + OA or adjuvant +/- NMBA    Final Airway Details  Final airway type: endotracheal airway      Successful airway: ETT     Successful intubation technique: video laryngoscopy  Facilitating devices/methods: intubating stylet  Endotracheal tube insertion site: oral  Blade size: #4  ETT size (mm): 6.5  Cormack-Lehane Classification: grade I - full view of glottis  Placement verified by: capnometry   Measured from: lips  Number of attempts at approach: 1

## 2024-10-21 NOTE — DISCHARGE INSTRUCTIONS
[facility]        Home Going Instructions after Hypoglossal Nerve Chest Device Removal    Activity:   DIET:  Resume normal diet  HYGIENE:  Please wait until 48 hours after surgery before getting incisions on neck, chest, and torso wet.  In the first 48 hours after surgery, will likely need to take sponge baths.  WOUND CARE:  Please leave pressure dressing on for 48 hours after surgery.  Gently place antibiotic ointment over incisions 2 times per day; use clean q-tip.  May place a clean bandage over incisions as needed.  After 48 hours, you may get incisions wet with warm soap and water, but do not soak the incisions.  Pat area dry gently.  Immediately place antibiotic ointment.  Take oral antibiotics as prescribed  If skin around incision starts to get red (> 1cm), swollen, and/or more painful, please call the office  ACTIVITY:  Try to avoid sleeping on the side of your surgery, to the extent possible.    You may walk for exercise starting the day after surgery.  For 2 weeks:  Do not  anything greater than 5 pounds with the hand/arm that's on the same side as the surgery.  After 2 weeks, you may increase weight to 10 pounds.    Consider performing neck rolls 10 clockwise and 10 counterclockwise 3x/day.  For 4 weeks, no strenuous activity (running, jogging, lifting weights, gardening, sports) or until cleared by physician.    PAIN MEDICATIONS:  You will be prescribed narcotics for pain.    If pain is not severe, consider taking Tylenol 650mg every 6 hours  Avoid aspirin for 7 days after surgery  Avoid direct heat (such as heating pads) to incision sites.    May gently place ice over surgery sites as needed.  Please place a thin, clean towel over skin first and then place ice bag over towel.  Ice for 10 minutes at a time only.  POST-OPERATIVE CLINIC APPOINTMENTS:  1 week: suture removal and wound check in the office.   SCAR CARE:  After incisions have healed, you will have  a scar, which will continue to evolve over the course of 12 months.  Caring for your incision scars will help them to be as minimal as possible.  If you are out in the sun with incision exposure, please remember to place sunscreen over the incision and surrounding skin.    You may use vitamin E or “Scar ointment/cream” to help soften scar.  Please wait one month after surgery before starting this.

## 2024-10-21 NOTE — ANESTHESIA POSTPROCEDURE EVALUATION
Patient: Lul Lizama    Procedure Summary       Date: 10/21/24 Room / Location: [facility] OR 05 / [facility] OR    Anesthesia Start: 0728 Anesthesia Stop: 1129    Procedure: Exploration Of Right Neck & Chest, Explantation Of Inspire Device (Right) Diagnosis:       Soft tissue infection      (Soft tissue infection [L08.9])    Surgeons: Laureano Skinner MD Responsible Provider: Annie Dalton MD    Anesthesia Type: general ASA Status: 2            Anesthesia Type: general    Vitals Value Taken Time   /78 10/21/24 1130   Temp 36.3 °C (97.3 °F) 10/21/24 1120   Pulse 93 10/21/24 1142   Resp 15 10/21/24 1138   SpO2 92 % 10/21/24 1142   Vitals shown include unfiled device data.    Anesthesia Post Evaluation    Patient location during evaluation: PACU  Patient participation: complete - patient participated  Level of consciousness: sleepy but conscious  Pain management: adequate  Airway patency: patent  Cardiovascular status: acceptable  Respiratory status: acceptable  Hydration status: acceptable  Postoperative Nausea and Vomiting: none        There were no known notable events for this encounter.

## 2024-10-21 NOTE — OP NOTE
Exploration Of Right Neck & Chest, Explantation Of Inspire Device (R) Operative Note     Date: 10/21/2024  OR Location: [facility] OR    Name: Lul Lizama, : 1975, Age: 49 y.o., MRN: [id number], Sex: adult    Diagnosis  Pre-op Diagnosis      * Soft tissue infection [L08.9] Post-op Diagnosis     * Soft tissue infection [L08.9]     Procedures  Exploration Of Right Neck & Chest, Explantation Of Inspire Device  59078 - GA OPEN IMPLTJ HPGLSL NRV NSTIM RA PG&RESPIR SENSOR      Surgeons      * Laureano Skinner - Primary    Resident/Fellow/Other Assistant:  Surgeons and Role:     * Emeterio Robertson MD - Resident - Assisting    Procedure Summary  Anesthesia: General  ASA: II  Anesthesia Staff: Anesthesiologist: Annie Dalton MD  CRNA: BERT Deutsch-CRNA  SRNA: Lilia Cosme  Estimated Blood Loss: 5mL  Intra-op Medications:   Administrations occurring from 0645 to 1030 on 10/21/24:   Medication Name Total Dose   lidocaine-epinephrine (Xylocaine W/EPI) 1 %-1:100,000 injection 10 mL   sodium chloride 0.9 % irrigation solution 1,000 mL   ceFAZolin (Ancef) vial 1 g 2 g   dexAMETHasone (Decadron) injection 4 mg/mL 8 mg   esmolol (Brevibloc) injection 50 mg   lactated Ringer's infusion Cannot be calculated   lidocaine (cardiac) injection 2% prefilled syringe 100 mg   midazolam PF (Versed) injection 1 mg/mL 2 mg   propofol (Diprivan) injection 10 mg/mL 200 mg   remifentanil (Ultiva) 1,000 mcg in lactated Ringer's 50 mL (20 mcg/mL) infusion 1.56 mg   rocuronium (ZeMuron) 50 mg/5 mL injection 40 mg              Anesthesia Record               Intraprocedure I/O Totals          Intake    Remifentanil Drip 0.00 mL    The total shown is the total volume documented since Anesthesia Start was filed.    lactated Ringer's 800.00 mL    Total Intake 800 mL       Output    Est. Blood Loss 5 mL    Total Output 5 mL       Net    Net Volume 795 mL          Specimen:   ID Type Source Tests Collected by Time   A : RIGHT CHEST  Swab SOFT TISSUE BIOPSY FUNGAL CULTURE/SMEAR, TISSUE/WOUND CULTURE/SMEAR Laureano Skinner MD 10/21/2024 8409        Staff:   Circulator: Danelle Elizabethub Person: Slime Elizabethub Person: Lm Wood Scrub: Leigha         Drains and/or Catheters: * None in log *    Tourniquet Times:         Implants:     Findings: Over the chest site at the battery there is a superficial area of purulence down to the subdermal fat.  Dissecting further there appear to be no gross infection surrounding the battery or the leads.  All components removed.        Indications: Lul Lizama is an 49 y.o. adult who is having surgery for Soft tissue infection [L08.9] overlying hypoglossal nerve stimulator chest device.      Procedure Details:   The patient was brought to the Operating Room and  was anesthetized via general endotracheal anesthesia without complication. A shoulder roll was placed and the patient was prepped and draped in usual sterile fashion with the head turned to the left. Prior to prepping and draping, electrodes were placed in the genioglossus and hyoglossus muscle and connected to the NIM box for intraoperative nerve monitoring.  The nerve stimulator device did not work and was swapped for a new one which was on standby for the case.     A modified sub-mandibular incision was made in the right upper neck  approximately 2 cm below the mandible. Dissection was carried down through the subcutaneous tissue and platysma. The anterior/inferior border of the submandibular gland was identified as well as the digastric tendon. The submandibular gland and the overlying fascia with the marginal mandibular nerve were retracted posteriorly. The digastric tendon was retracted inferiorly.  The stimulator wire was encountered at this portion of the case and was traced down to the hypoglossal nerve and removed.  The sutures holding the lead in place were cut with tenotomy scissors.  The nerve was noted to be grossly intact no  purulence or evidence of infection was encountered in the neck    A second 5 cm incision was made in the right upper chest over the second  intercostal space, at the site of the soft tissue infection.  A culture was obtained.  Incision was made approximately 3cm lateral to the sternal margin. Dissection was carried down through the skin and subcutaneous tissue to the fascia of the pectoralis muscle. There was tissue necrosis and purulent secretion that was cleared with scissor and washed profusely. The  stimulator device was encountered and removed after the sutures holding the device in place were cut. Due to present infection a detailed and delicate dissection was carried. Next, the stimulator lead for the neck was retrieved by cutting the wire close to the battery and pulling the wire out through the neck.  The wire was noted to snap in half and careful attention was used to ensure retrieval of all parts.  The bladder was then inspected after complete removal and noted to be a clean break without evidence of further retained pieces.    Next to retrieve the sensing lead, the pectoralis major fascia was dissected directly over the first intercostal space with subsequent blunt dissection through the muscle. The pectoralis major/minor was then retracted to expose the fatty layer just superficial to the external intercostals. The fatty layer was carefully swept away to expose the  external intercostal muscles.  Next, the 2 anchors for the sensing lead were removed and sutures holding them in place were cut.  The sensing lead was then removed.      All the wounds were thoroughly irrigated with Irrisept and closed in three layers with deep 3-0 vicryl sutures and a 5-0 fast gut for the skin.  Next, bacitracin and fluffs were used for dressing and a Tegaderm over top.  This was done for both incisions.   The patient was then awakened, extubated, and transferred to Recovery Room in stable condition. Dr. Sims was  present for and performed the entire procedure with the assistance of a resident physician.      Additional Details: None    Attending Attestation:     Laureano Skinner  Phone Number: [phone]

## 2024-10-21 NOTE — HOSPITAL COURSE
Lul Lizama is a 49 y.o. adult with infection to his hypoglossal nerve stimulator, who presented for explantation of hypoglossal nerve stimulator by Dr Sims on 10/21/2024. Patient had an uncomplicated surgical course. Patient recovered in PACU and was transferred to [place] [id number] for post-operative care.    Patient post-operative course was uncomplicated.  On day of discharge, post-operative pain was well controlled with enteral pain medication, breathing on room air, voiding spontaneously ambulating well, and was tolerating a diet. Follow-up arranged.  Discharged on Augmentin until 11/16.

## 2024-10-21 NOTE — CARE PLAN
The patient's goals for the shift include      The clinical goals for the shift include pain control      Problem: Pain  Goal: Takes deep breaths with improved pain control throughout the shift  Outcome: Progressing  Goal: Turns in bed with improved pain control throughout the shift  Outcome: Progressing  Goal: Walks with improved pain control throughout the shift  Outcome: Progressing  Goal: Performs ADL's with improved pain control throughout shift  Outcome: Progressing  Goal: Participates in PT with improved pain control throughout the shift  Outcome: Progressing  Goal: Free from opioid side effects throughout the shift  Outcome: Progressing

## 2024-10-22 VITALS
SYSTOLIC BLOOD PRESSURE: 138 MMHG | RESPIRATION RATE: 16 BRPM | DIASTOLIC BLOOD PRESSURE: 78 MMHG | HEART RATE: 63 BPM | OXYGEN SATURATION: 100 % | BODY MASS INDEX: 32.64 KG/M2 | WEIGHT: 215.39 LBS | TEMPERATURE: 97.3 F | HEIGHT: 68 IN

## 2024-10-22 PROCEDURE — 2500000001 HC RX 250 WO HCPCS SELF ADMINISTERED DRUGS (ALT 637 FOR MEDICARE OP)

## 2024-10-22 RX ADMIN — OXYCODONE HYDROCHLORIDE 10 MG: 5 TABLET ORAL at 03:26

## 2024-10-22 RX ADMIN — Medication 5000 UNITS: at 08:38

## 2024-10-22 RX ADMIN — AMOXICILLIN AND CLAVULANATE POTASSIUM 875 MG: 875; 125 TABLET, FILM COATED ORAL at 08:38

## 2024-10-22 ASSESSMENT — PAIN SCALES - GENERAL: PAINLEVEL_OUTOF10: 7

## 2024-10-22 NOTE — NURSING NOTE
Pt discharging home. No follow up appointment schedule but rn asked Josemanuel KEN to call pt when follow up appointment is established. IV removed. Pt has no other questions about discharge at this time.

## 2024-10-22 NOTE — DISCHARGE SUMMARY
Discharge Diagnosis  Soft tissue infection    Issues Requiring Follow-Up  Postoperative pain  Infection of hardware/cellulitis    Test Results Pending At Discharge  Pending Labs       Order Current Status    Fungal Culture/Smear In process    Tissue/Wound Culture/Smear Preliminary result            Hospital Course  Lul Lizama is a 49 y.o. adult with infection to his hypoglossal nerve stimulator, who presented for explantation of hypoglossal nerve stimulator by Dr Sims on 10/21/2024. Patient had an uncomplicated surgical course. Patient recovered in PACU and was transferred to [place] [address] for post-operative care.    Patient post-operative course was uncomplicated.  On day of discharge, post-operative pain was well controlled with enteral pain medication, breathing on room air, voiding spontaneously ambulating well, and was tolerating a diet. Follow-up arranged.  Discharged on Augmentin until 11/16.    Pertinent Physical Exam At Time of Discharge  Physical Exam  GEN: The patient appears stated age in no acute distress  VOICE: No dysphonia  RESP: Unlabored on room air with no audible stertor or stridor  CV: Clinically well perfused   EYES: EOM grossly intact with no scleral icterus  NEURO: Alert and oriented.  Cranial nerves I through XII intact.  Tongue with bilateral mobility.  Arms without numbness or movement deficit.  HEAD: Scalp is normocephalic and atraumatic  EARS: Normal external ears  NOSE: External nose appears normal  OC: MMM  NECK: Trachea midline.  Right neck incision with gauze overlying with minimal strikethrough.  No purulence noted.  Chest: Chest incision with gauze over the overlying incision without strikethrough.  No purulence noted.  No surrounding erythema.    Home Medications     Medication List      CONTINUE taking these medications     amoxicillin-pot clavulanate 875-125 mg tablet; Commonly known as:   Augmentin; Take 1 tablet (875 mg) by mouth 2 times a day.   docusate sodium 100 mg  capsule; Commonly known as: Colace; Take 1   capsule (100 mg) by mouth 2 times a day as needed for constipation for up   to 5 days.   oxyCODONE 5 mg immediate release tablet; Commonly known as: Roxicodone;   Take 2 tablets (10 mg) by mouth every 6 hours if needed for severe pain (7   - 10).   Vitamin D3 5,000 Units tablet; Generic drug: cholecalciferol     ASK your doctor about these medications     pantoprazole 40 mg EC tablet; Commonly known as: ProtoNix; Take 1 tablet   (40 mg) by mouth once daily in the morning. Take before meals. Do not   crush, chew, or split.       Outpatient Follow-Up  No future appointments.    Emeterio Robertson MD

## 2024-10-23 LAB
BACTERIA SPEC CULT: NORMAL
GRAM STN SPEC: NORMAL
GRAM STN SPEC: NORMAL

## 2024-10-25 LAB
FUNGUS SPEC CULT: NORMAL
FUNGUS SPEC FUNGUS STN: NORMAL

## 2024-10-28 LAB
FUNGUS SPEC CULT: NORMAL
FUNGUS SPEC FUNGUS STN: NORMAL

## 2024-11-01 NOTE — PROGRESS NOTES
11/8/2024 Post op visit sp Exploration Of Right Neck & Chest, Explantation Of Inspire Device on 10/21/2024    Post op visit for HNS    Patient returns for Inspire explantation post-op fu.     Reports to be well and that did not need a lot of pain medications. Specifically, took opiates only on initial days. Reports no more symptoms. Fevers and malaise were better the next day of removal.      Denies fevers, nauseas or other constitutional symptoms. Patient is healing well from both incisions. No signs of infections or significant inflammation. Presents with no local induration on neck or hematoma. Chest incision is looking well. No sings of infections. Overall good healing from procedure. Patient is to reach out if any warning signs take place.

## 2024-11-04 ENCOUNTER — OFFICE VISIT (OUTPATIENT)
Dept: URGENT CARE | Age: 49
End: 2024-11-04
Payer: COMMERCIAL

## 2024-11-04 VITALS
TEMPERATURE: 97.8 F | DIASTOLIC BLOOD PRESSURE: 84 MMHG | HEIGHT: 68 IN | HEART RATE: 63 BPM | RESPIRATION RATE: 16 BRPM | OXYGEN SATURATION: 98 % | WEIGHT: 210 LBS | BODY MASS INDEX: 31.83 KG/M2 | SYSTOLIC BLOOD PRESSURE: 131 MMHG

## 2024-11-04 DIAGNOSIS — H57.12 PAIN OF LEFT EYE: ICD-10-CM

## 2024-11-04 DIAGNOSIS — H10.32 ACUTE BACTERIAL CONJUNCTIVITIS OF LEFT EYE: ICD-10-CM

## 2024-11-04 LAB
FUNGUS SPEC CULT: NORMAL
FUNGUS SPEC FUNGUS STN: NORMAL

## 2024-11-04 PROCEDURE — 99203 OFFICE O/P NEW LOW 30 MIN: CPT | Performed by: PHYSICIAN ASSISTANT

## 2024-11-04 PROCEDURE — 1036F TOBACCO NON-USER: CPT | Performed by: PHYSICIAN ASSISTANT

## 2024-11-04 PROCEDURE — 3008F BODY MASS INDEX DOCD: CPT | Performed by: PHYSICIAN ASSISTANT

## 2024-11-04 RX ORDER — ERYTHROMYCIN 5 MG/G
OINTMENT OPHTHALMIC EVERY 6 HOURS
Qty: 1 G | Refills: 0 | Status: SHIPPED | OUTPATIENT
Start: 2024-11-04 | End: 2024-11-04

## 2024-11-04 RX ORDER — ERYTHROMYCIN 5 MG/G
OINTMENT OPHTHALMIC EVERY 6 HOURS
Qty: 1 G | Refills: 0 | Status: SHIPPED | OUTPATIENT
Start: 2024-11-04 | End: 2024-11-09

## 2024-11-04 ASSESSMENT — ENCOUNTER SYMPTOMS
EYE DISCHARGE: 1
EYE WATERING: 1
PERI-ORBITAL EDEMA: 0
EYE PAIN: 1
PHOTOPHOBIA: 0
EYE ITCHING: 1
EYE REDNESS: 1
CRUSTING: 1

## 2024-11-04 NOTE — PATIENT INSTRUCTIONS
Use erythromycin under left lower eyelid every 6 hours for the next 5 days. Follow up with eye doctor. Use cool compresses for comfort.

## 2024-11-04 NOTE — PROGRESS NOTES
Subjective   Patient ID: Lul Lizama is a 49 y.o. adult. They present today with a chief complaint of Eye Problem (Right eye issue patient says he feels like something is inside his eye, it feels irritated and gets goopy every morning).    History of Present Illness  Patient is a 49 year old male presenting to the  with complaint of left eye pain. Symptoms started Friday while he was working in his garage. Initially felt like there is something in his left eye. Since then has has irritation, redness, purulent drainage and crusting in the morning and itching. No sick contacts. Blurry vision on Saturday now resolved. No photophobia. Does not wear contacts or glasses. He has tried over the counter lubricating drops and old antibiotic drops that his wife has without relief. Has also been using warm compresses for discharge and crusting.       History provided by:  Patient   used: No    Eye Problem  Location:  Left eye  Associated symptoms: crusting, discharge, itching, redness and tearing    Associated symptoms: no photophobia and no swelling    Risk factors: no exposure to pinkeye, no previous injury to eye and no recent URI        Past Medical History  Allergies as of 11/04/2024   • (No Known Allergies)       (Not in a hospital admission)       History reviewed. No pertinent past medical history.    History reviewed. No pertinent surgical history.     reports that he has quit smoking. His smoking use included cigarettes. He has a 5 pack-year smoking history. He has quit using smokeless tobacco.  His smokeless tobacco use included chew. He reports current alcohol use of about 2.0 standard drinks of alcohol per week. He reports that he does not use drugs.    Review of Systems  Review of Systems   Eyes:  Positive for pain, discharge, redness and itching. Negative for photophobia.                                  Objective    Vitals:    11/04/24 1141   BP: 131/84   BP Location: Left arm   Patient  "Position: Sitting   BP Cuff Size: Adult   Pulse: 63   Resp: 16   Temp: 36.6 °C (97.8 °F)   TempSrc: Oral   SpO2: 98%   Weight: 95.3 kg (210 lb)   Height: 1.727 m (5' 8\")     No LMP recorded.    Physical Exam  Constitutional:       General: He is not in acute distress.     Appearance: Normal appearance. He is normal weight. He is not ill-appearing or toxic-appearing.   HENT:      Head: Normocephalic.      Nose: Nose normal.   Eyes:      General: No scleral icterus.        Right eye: No discharge.         Left eye: Discharge present.     Extraocular Movements: Extraocular movements intact.      Right eye: Normal extraocular motion and no nystagmus.      Left eye: Normal extraocular motion and no nystagmus.      Conjunctiva/sclera:      Right eye: Right conjunctiva is not injected.      Left eye: Left conjunctiva is injected. No chemosis, exudate or hemorrhage.     Pupils: Pupils are equal, round, and reactive to light. Pupils are equal.      Right eye: Pupil is round and reactive.      Left eye: Pupil is round and reactive. No corneal abrasion or fluorescein uptake. Ashwini exam negative.     Slit lamp exam:     Left eye: No corneal ulcer.   Neurological:      Mental Status: He is alert.       Procedures    Point of Care Test & Imaging Results from this visit  No results found for this visit on 11/04/24.   No results found.    Diagnostic study results (if any) were reviewed by Citlali Lennon PA-C.    Assessment/Plan   Allergies, medications, history, and pertinent labs/EKGs/Imaging reviewed by Citlali Lennon PA-C.     Medical Decision Making  Fluorescein staining without evidence of corneal abrasion, corneal ulcer, globe rupture. No foreign body on exam. There is purulent drainage in medial canthus and conjunctival injection consistent with bacterial conjunctivitis.     Orders and Diagnoses  Diagnoses and all orders for this visit:  Acute bacterial conjunctivitis of left eye  -     Referral to Ophthalmology; " Future  -     erythromycin (Romycin) 5 mg/gram (0.5 %) ophthalmic ointment; Apply to left eye every 6 hours for 5 days. Apply Amount per Dose: 0.5 inch (~1 cm) per dose.  Pain of left eye  -     Referral to Ophthalmology; Future      Medical Admin Record      Patient disposition: Home    Electronically signed by Citlali Lennon PA-C  12:30 PM

## 2024-11-06 ENCOUNTER — APPOINTMENT (OUTPATIENT)
Dept: OTOLARYNGOLOGY | Facility: CLINIC | Age: 49
End: 2024-11-06
Payer: COMMERCIAL

## 2024-11-08 ENCOUNTER — APPOINTMENT (OUTPATIENT)
Dept: OTOLARYNGOLOGY | Facility: CLINIC | Age: 49
End: 2024-11-08
Payer: COMMERCIAL

## 2024-11-08 VITALS
TEMPERATURE: 98.5 F | WEIGHT: 216 LBS | SYSTOLIC BLOOD PRESSURE: 126 MMHG | HEIGHT: 68 IN | BODY MASS INDEX: 32.74 KG/M2 | DIASTOLIC BLOOD PRESSURE: 80 MMHG | HEART RATE: 67 BPM

## 2024-11-08 DIAGNOSIS — L08.9 SOFT TISSUE INFECTION: Primary | ICD-10-CM

## 2024-11-08 PROCEDURE — 99214 OFFICE O/P EST MOD 30 MIN: CPT

## 2024-11-08 PROCEDURE — 99024 POSTOP FOLLOW-UP VISIT: CPT

## 2024-11-08 PROCEDURE — 1036F TOBACCO NON-USER: CPT

## 2024-11-08 PROCEDURE — 3008F BODY MASS INDEX DOCD: CPT

## 2024-11-08 SDOH — ECONOMIC STABILITY: FOOD INSECURITY: WITHIN THE PAST 12 MONTHS, THE FOOD YOU BOUGHT JUST DIDN'T LAST AND YOU DIDN'T HAVE MONEY TO GET MORE.: NEVER TRUE

## 2024-11-08 SDOH — ECONOMIC STABILITY: FOOD INSECURITY: WITHIN THE PAST 12 MONTHS, YOU WORRIED THAT YOUR FOOD WOULD RUN OUT BEFORE YOU GOT MONEY TO BUY MORE.: NEVER TRUE

## 2024-11-08 ASSESSMENT — LIFESTYLE VARIABLES
HOW MANY STANDARD DRINKS CONTAINING ALCOHOL DO YOU HAVE ON A TYPICAL DAY: 1 OR 2
SKIP TO QUESTIONS 9-10: 1
HOW OFTEN DO YOU HAVE A DRINK CONTAINING ALCOHOL: 2-3 TIMES A WEEK
AUDIT-C TOTAL SCORE: 3
HOW OFTEN DO YOU HAVE SIX OR MORE DRINKS ON ONE OCCASION: NEVER

## 2024-11-08 ASSESSMENT — PATIENT HEALTH QUESTIONNAIRE - PHQ9
1. LITTLE INTEREST OR PLEASURE IN DOING THINGS: NOT AT ALL
2. FEELING DOWN, DEPRESSED OR HOPELESS: NOT AT ALL
SUM OF ALL RESPONSES TO PHQ9 QUESTIONS 1 AND 2: 0

## 2024-11-08 ASSESSMENT — COLUMBIA-SUICIDE SEVERITY RATING SCALE - C-SSRS
6. HAVE YOU EVER DONE ANYTHING, STARTED TO DO ANYTHING, OR PREPARED TO DO ANYTHING TO END YOUR LIFE?: NO
2. HAVE YOU ACTUALLY HAD ANY THOUGHTS OF KILLING YOURSELF?: NO
1. IN THE PAST MONTH, HAVE YOU WISHED YOU WERE DEAD OR WISHED YOU COULD GO TO SLEEP AND NOT WAKE UP?: NO

## 2024-11-08 ASSESSMENT — PAIN SCALES - GENERAL: PAINLEVEL_OUTOF10: 0-NO PAIN

## 2024-11-08 ASSESSMENT — ENCOUNTER SYMPTOMS
DEPRESSION: 0
LOSS OF SENSATION IN FEET: 0
OCCASIONAL FEELINGS OF UNSTEADINESS: 0

## 2024-11-11 ENCOUNTER — DOCUMENTATION (OUTPATIENT)
Dept: IMMUNOLOGY | Facility: CLINIC | Age: 49
End: 2024-11-11
Payer: COMMERCIAL

## 2024-11-11 LAB — FUNGUS SPEC FUNGUS STN: NORMAL

## 2024-11-11 NOTE — PROGRESS NOTES
11/11/2024 infectious diseases update  Patient seen during recent hospitalizations 10/11/2024 -17 2024.  See 10/15/2024 ID consult ([facility]).  10/13/2024 culture grew mixed anaerobic bacteria.  Patient was treated with IV Zosyn and vancomycin  10/17/2024 OR rescheduled and patient discharged on oral amoxicillin clavulanic acid with the plan to complete 2 weeks of additional oral therapy after device and lead removal.    Patient admitted 10/21/2024 for device and lead removal.     See 10/24/2024 op note excerpt below.          No signs of infection in the submandibular and lead area at the hypoglossal nerve          Purulence and necrotic subcutaneous tissue noted at the upper anterior chest device site.  Culture sent.     10/21/2024 bacterial culture: Mixed gram-positive and gram-negative bacteria (no additional analysis)     10/21/2024 fungal culture of chest sensor device:   11/11/2024 fungal culture update shows 3 single colonies of acid-fast bacilli after 3 weeks incubation.  10/15/2024 culture showed mixed anaerobic organisms consistent with bacterial infection of the device.            I spoke to the lab.  The isolate might be a rapid grower but will see after subculture on proper media and incubation.     Would not have suspected AFB as a primary culprit.  10/13/2024 culture showed mixed abundant mixed anaerobic bacteria (beta lactamase positive).  It is possible that wound / site was secondarily contaminated / colonized after it began draining 10/17/24.         I spoke to the patient this afternoon for approximately 20 minutes reviewing results.  Today patient reports no concerns about redness, swelling or persistent drainage.  11/8/2024 ENT note indicates patient healing well.  Patient did report that after discharge 10/17/2024, he had significant drainage which I had noted increased prior to discharge.  He did not describe any open wound.  Patient was showering and keeping the area clean.  Interestingly  "shower water, and shower heads are a frequent source of nontuberculous mycobacteria (like M. avium) so patient may have had contamination of his wound with an M. avium, which often take 2 to 3 weeks to grow.  Also very low colony count suggest colonization/contamination.  NTM can be associated with device infections but this is very rare and would not be expected even in this setting when the patient's device had been unused and placed 4 years ago.     Even if patient has elvin yadi NTM infection, I&D, and debridement may have been sufficient in a patient that is immunocompetent.    10/21/2024 positive chest device fungal culture     (Culture updated 11/11/2024 for 3 CFU of AFB)  I spoke to patient.    For now, no signs of recrudescent or progressive infection at this time.    Will monitor carefully awaiting more culture information   Ultimately organism will be sent to reference lab for ID and abx testing  I will follow up with patient over the next several weeks   (ENT notified by lab) and copied on this note.         #####  10/21/2024 OP note excerpt:       \"A modified sub-mandibular incision was made in the right upper neck  approximately 2 cm below the mandible. Dissection was carried. . .  The digastric tendon was retracted inferiorly.  The stimulator wire was encountered at this portion of the case and was traced down to the hypoglossal nerve and removed.  The sutures holding the lead in place were cut with tenotomy scissors.  The nerve was noted to be grossly intact no purulence or evidence of infection was encountered in the neck.\"       \"A second 5 cm incision was made in the right upper chest over the second  intercostal space, at the site of the soft tissue infection.  A culture was obtained.  Incision was made approximately 3cm lateral to the sternal margin. Dissection was carried down through the skin and subcutaneous tissue to the fascia of the pectoralis muscle. There was tissue necrosis and purulent " "secretion that was cleared with scissor and washed profusely. The  stimulator device was encountered and removed after the sutures holding the device in place were cut. . . .  Next, the stimulator lead for the neck was retrieved by cutting the wire close to the battery and pulling the wire out through the neck.  The wire was noted to snap in half and careful attention was used to ensure retrieval of all parts.  The bladder was then inspected after complete removal and noted to be a clean break without evidence of further retained pieces.. . .\"      \"Next to retrieve the sensing lead, the pectoralis major fascia was dissected directly over the first intercostal space . . . The fatty layer was carefully swept away to expose the external intercostal muscles.  Next, the 2 anchors for the sensing lead were removed and sutures holding them in place were cut.  The sensing lead was then removed.\"  "

## 2024-11-20 ENCOUNTER — TELEPHONE (OUTPATIENT)
Dept: INFECTIOUS DISEASES | Facility: HOSPITAL | Age: 49
End: 2024-11-20

## 2024-11-20 ENCOUNTER — OFFICE VISIT (OUTPATIENT)
Dept: OTOLARYNGOLOGY | Facility: CLINIC | Age: 49
End: 2024-11-20
Payer: COMMERCIAL

## 2024-11-20 ENCOUNTER — TELEPHONE (OUTPATIENT)
Dept: OTOLARYNGOLOGY | Facility: CLINIC | Age: 49
End: 2024-11-20

## 2024-11-20 VITALS — WEIGHT: 210 LBS | TEMPERATURE: 98.6 F | HEIGHT: 68 IN | BODY MASS INDEX: 31.83 KG/M2

## 2024-11-20 DIAGNOSIS — T81.49XA SURGICAL WOUND INFECTION: ICD-10-CM

## 2024-11-20 LAB — FUNGUS SPEC FUNGUS STN: NORMAL

## 2024-11-20 PROCEDURE — 87205 SMEAR GRAM STAIN: CPT

## 2024-11-20 PROCEDURE — 87186 SC STD MICRODIL/AGAR DIL: CPT

## 2024-11-20 PROCEDURE — 87075 CULTR BACTERIA EXCEPT BLOOD: CPT

## 2024-11-20 PROCEDURE — 87070 CULTURE OTHR SPECIMN AEROBIC: CPT

## 2024-11-20 PROCEDURE — 87077 CULTURE AEROBIC IDENTIFY: CPT

## 2024-11-20 RX ORDER — AMOXICILLIN AND CLAVULANATE POTASSIUM 875; 125 MG/1; MG/1
1 TABLET, FILM COATED ORAL 2 TIMES DAILY
Qty: 14 TABLET | Refills: 0 | Status: SHIPPED | OUTPATIENT
Start: 2024-11-20 | End: 2024-11-24 | Stop reason: ALTCHOICE

## 2024-11-20 ASSESSMENT — PATIENT HEALTH QUESTIONNAIRE - PHQ9
SUM OF ALL RESPONSES TO PHQ9 QUESTIONS 1 AND 2: 0
1. LITTLE INTEREST OR PLEASURE IN DOING THINGS: NOT AT ALL
2. FEELING DOWN, DEPRESSED OR HOPELESS: NOT AT ALL

## 2024-11-20 NOTE — TELEPHONE ENCOUNTER
Topic Post op redness and drainage at right neck incision.    Pt called Dr Sims's office:  Patient had surgery and he noticed this morning on his incision he has some bleeding and puss.     Dr Sims spoke with patient on the phone and consulted with pt's Infectious Disease provider, Dr Daniel Madden.    [facility] to see the [facility] for assessment, culture and antibiotic.  See Dr Sims's clinic note for details

## 2024-11-20 NOTE — TELEPHONE ENCOUNTER
Per your instructions, Mr. Lizama (haritha: [phone]) called to report this morning he started having bloody pus leaking from his neck wound. The area feels more tender than usual. He's had occasional/sporadic night sweats, but not every night. He denies having an elevated body temperature. He has also notified Dr. Laureano Skinner, his surgeon, this morning as well. Please advise.

## 2024-11-20 NOTE — PROGRESS NOTES
11/20/2024 Follow up visit for neck incision erythema and drainage    Patient reached out because he developed some erythema and tenderness on neck incision.  He reports some bloody drainage in the morning but he has not continued through the day.      Pt does not have significant symptoms  (denies fevers or shivering) and at this point neck incision does not necessarily look infected or have indication of abscess formation.     During procedure neck was naive and no infection was noted.  We did neck first to avoid cross-contamination.  Chest had necrotic tissue and purulence that was fully washed and debrided.     Today exam show superficial erythema some tenderness at touch but no signs of deep infection or fluctuation or significant lymph node changes in neck.  Exam is suggestive of suture abscess.  Secretion is collected and sent for culture.  Case is discussed with infectious disease.    Patient started on Augmentin and will do close follow-up.      Addendum 11/22/2024  Called patient today the reports significant improvement in symptoms including tenderness erythema secretion.  Patient denies any other symptoms at this time.   Infectious disease providers informed and close follow-up will be done.        11/8/2024 Post op visit sp Exploration Of Right Neck & Chest, Explantation Of Inspire Device on 10/21/2024     Post op visit for HNS     Patient returns for Inspire explantation post-op fu.      Reports to be well and that did not need a lot of pain medications. Specifically, took opiates only on initial days. Reports no more symptoms. Fevers and malaise were better the next day of removal.       Denies fevers, nauseas or other constitutional symptoms. Patient is healing well from both incisions. No signs of infections or significant inflammation. Presents with no local induration on neck or hematoma. Chest incision is looking well. No sings of infections. Overall good healing from procedure. Patient is to  reach out if any warning signs take place.

## 2024-11-21 LAB — HOLD SPECIMEN: NORMAL

## 2024-11-23 LAB
BACTERIA SPEC CULT: ABNORMAL
BACTERIA SPEC CULT: ABNORMAL
GRAM STN SPEC: ABNORMAL
GRAM STN SPEC: ABNORMAL

## 2024-11-24 ENCOUNTER — TELEPHONE (OUTPATIENT)
Dept: OTOLARYNGOLOGY | Facility: CLINIC | Age: 49
End: 2024-11-24
Payer: COMMERCIAL

## 2024-11-24 DIAGNOSIS — T81.49XA SURGICAL WOUND INFECTION: Primary | ICD-10-CM

## 2024-11-24 RX ORDER — CIPROFLOXACIN 500 MG/1
500 TABLET ORAL 2 TIMES DAILY
Qty: 14 TABLET | Refills: 0 | Status: SHIPPED | OUTPATIENT
Start: 2024-11-24 | End: 2024-12-01

## 2024-11-24 NOTE — PROGRESS NOTES
Reached out to patient for follow-up. Patient reports significant improvement. Resolution of erythema and tenderness on neck. No longer has any type of drainage. Chest continues to be clear and scaring well from procedure.   Change to Ciprofloxacin 500mg BID for 7 days due to Pseudomonas growth on culture. Patient is instructed to send pictures of evolution through [facility] and keep us updated. 
[phone]

## 2024-11-25 NOTE — TELEPHONE ENCOUNTER
Called patient to discuss evolution and results of the culture.  Culture showed 1+ Pseudomonas grow besides 1+ rare Gram positive cocci with susceptibility different antibiotics.  Ciprofloxacin is started is to continuously update us of evolution.  Patient reports significant improvement in symptoms no longer has erythema or  tenderness or draining.

## 2024-11-27 LAB — FUNGUS SPEC FUNGUS STN: NORMAL

## 2024-12-02 ENCOUNTER — DOCUMENTATION (OUTPATIENT)
Dept: IMMUNOLOGY | Facility: CLINIC | Age: 49
End: 2024-12-02
Payer: COMMERCIAL

## 2024-12-02 NOTE — PROGRESS NOTES
12/2/2024 infectious disease update  See 11/11/2024 update note:       10/21/2024 fungal culture of chest sensor device:       11/11/2024 fungal culture update shows 3 single colonies of acid-fast bacilli after 3 weeks incubation.  10/15/2024 culture showed mixed anaerobic organisms consistent with bacterial infection of the device.       Would not have suspected AFB as a primary culprit.  10/13/2024 culture showed mixed abundant mixed anaerobic bacteria (beta lactamase positive).  It is possible that wound / site was secondarily contaminated / colonized after it began draining 10/17/24.          Patient did report that after discharge 10/17/2024, he had significant drainage which I had noted increased prior to discharge.  He did not describe any open wound.  Patient was showering and keeping the area clean.  Interestingly shower water, and shower heads are a frequent source of nontuberculous mycobacteria (like M. avium) so patient may have had contamination of his wound.    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  I suspect this is not a primary pathogen and may have been wound contaminant between patient's discharge on 10/17/2024, showering and maintaining local wound care, followed by 10/21/2024 device removal.    Will monitor for signs of infection noting rarity of nontuberculous mycobacterial device infections.      Will discuss with patient.    Parenthetically, patient had some erythema and redness (question bloody drainage) on 11/20/2024 in the submandibular area - quite removed from the chest sensor device site below the clavicle.  Superficial culture at that time sent by ENT grew susceptible Pseudomonas aeruginosa which was treated with a 7-day course of ciprofloxacin.  On 11/24 2024 patient reported to ENT that there had been significant improvement and no additional redness, tenderness or drainage.    TODAY 12/02/2024 I spoke to the patient who  verified resolution of submandibular erythema tenderness and scant drainage several weeks ago.   No concerns currently about redness, swelling, tenderness or drainage from previous upper chest wall sensor device site.  Continue monitoring.  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  As indicated however if there were a elvin yadi device associated infection, with removal of the device and in the setting of normal immune system patient should resolve any subclinical elvin yadi infection.  Patient to call in 2 weeks with update.  Will continue to follow-up periodically afterwards

## 2024-12-05 ENCOUNTER — TELEPHONE (OUTPATIENT)
Dept: OTOLARYNGOLOGY | Facility: CLINIC | Age: 49
End: 2024-12-05
Payer: COMMERCIAL

## 2024-12-05 ENCOUNTER — DOCUMENTATION (OUTPATIENT)
Dept: IMMUNOLOGY | Facility: CLINIC | Age: 49
End: 2024-12-05
Payer: COMMERCIAL

## 2024-12-05 NOTE — PROGRESS NOTES
" infectious Diseases Clinic Follow-up:    Reason for Visit: Urgent visit request regarding new upper chest wall drainage at prior Inspire device site  (device removed 10/21/24)    History of Current Issue  Patient known from recent hospitalization.  10/17/2024 inpatient ID consult follow-up note (Madden):       49 y.o. male presenting with acute erythema, fluctuance, and pain at hypoglossal nerve stimulator implant site consistent with soft tissue infection. Patient remains hemodynamically stable, afebrile, and WBC wnl along with exam suggests localized infection. Aspirate culture is likely to grow Staph aureus or other skin sobia, but will follow culture results to direct therapy.        Definitive treatment will involve removal of the device and a course of directed antibiotics to address soft tissue infection. Etiology of this infection is unlikely due to hematologic spread of bacteria due to lack of any systemic symptoms or risk factors for bacteremia.  #Right chest wall soft tissue infection    - 10/11: Started on vancomycin and zosyn   - 10/13: Aspiration culture sent and shows ONLY mixed gram-negative anaerobic rods    - Patient stable and could take oral Augmentin 875 mg p.o. twice daily at home and return for elective outpatient device removal.      - After device removal would recommend 2-week course of Augmentin    10/21/2024 OP note excerpts:        \". . . the stimulator wire was encountered at this portion of the case and was traced down to the hypoglossal nerve and removed. The sutures holding the lead in place were cut with tenotomy scissors. The nerve was noted to be grossly intact no purulence or evidence of infection was encountered in the neck . . .\"   \". . . at the site of the soft tissue infection.  A culture was obtained.  Incision was made approximately 3cm lateral to the sternal margin. Dissection was carried down through the skin and subcutaneous tissue to the fascia of the pectoralis " "muscle. There was tissue necrosis and purulent secretion that was cleared . . .  The  stimulator device was encountered and removed after the sutures holding the device in place were cut. . . . Next, the stimulator lead for the neck was retrieved by cutting the wire close to the battery and pulling the wire out through the neck . . . \"   \" . . . to retrieve the sensing lead, the pectoralis major fascia was dissected directly over the first intercostal space with subsequent blunt dissection through the muscle. The pectoralis major/minor was then retracted to expose the fatty layer just superficial to the external intercostals. The fatty layer was carefully swept away to expose the  external intercostal muscles.  Next, the 2 anchors for the sensing lead were removed and sutures holding them in place were cut.  The sensing lead was then removed.  . . .\"    11/11/2024 ID update (Chano):  10/13/2024 culture grew mixed anaerobic bacteria.  Patient was treated with IV Zosyn and vancomycin  10/17/2024 OR rescheduled and patient discharged on oral amoxicillin clavulanic acid with the plan to complete 2 weeks of additional oral therapy after device and lead removal.  Patient admitted 10/21/2024 for device and lead removal.  No signs of infection in the submandibular and lead area at the hypoglossal nerve           Purulence and necrotic subcutaneous tissue noted at the upper anterior chest device site.  Culture sent.  10/21/2024 bacterial culture: Mixed gram-positive and gram-negative bacteria (no additional analysis)      10/21/2024 fungal culture of chest sensor device site:              11/11/2024 fungal culture update shows 3 single colonies of acid-fast bacilli after 3 weeks incubation.    10/13/2024 culture showed mixed anaerobic organisms consistent with bacterial infection of the device.              I spoke to the lab.  The isolate might be a rapid grower but will see after subculture on proper media and " incubation.     Would not have suspected AFB as a primary culprit.  10/13/2024 culture showed mixed abundant mixed anaerobic bacteria (beta lactamase positive).  It is possible that wound / site was secondarily contaminated / colonized after it began draining 10/17/24.    I spoke to the patient this afternoon for approximately 20 minutes reviewing results.  Today patient reports no concerns about redness, swelling or persistent drainage.  11/8/2024 ENT note indicates patient healing well.  Patient did report that after discharge 10/17/2024, he had significant drainage which I had noted increased prior to discharge.  He did not describe any open wound.  Patient was showering and keeping the area clean.  Interestingly shower water, and shower heads are a frequent source of nontuberculous mycobacteria (like M. avium) so patient may have had contamination of his wound with an M. avium, which often take 2 to 3 weeks to grow.  Also very low colony count suggest colonization/contamination.  NTM can be associated with device infections but this is very rare and would not be expected even in this setting when the patient's device had been unused and placed 4 years ago.  Even if patient has elvin yadi NTM infection, I&D, and debridement may have been sufficient in a patient that is immunocompetent.     11/20/2024 ENT update:   Patient contacted ENT and was evaluated for new erythema and scant drainage.  Culture grew Pseudomonas aeruginosa.  Patient was treated with a 7-day course of ciprofloxacin (started 11/24/2024) for presumed stitch abscess.  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  I suspect this is not a primary pathogen and may have been wound contaminant between patient's discharge on 10/17/2024, showering and maintaining local wound care, followed by 10/21/2024 device removal.   Will monitor for signs of infection  noting rarity of nontuberculous mycobacterial device infections.    On 11/24 2024 patient reported to ENT that there had been significant improvement and no additional redness, tenderness or drainage.    12/02/2024 I spoke to the patient who verified resolution of submandibular erythema tenderness and scant drainage several weeks ago.   No concerns currently about redness, swelling, tenderness or drainage from previous upper chest wall sensor device site.  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  As indicated however if there were a elvin yadi device associated infection, with removal of the device and in the setting of normal immune system patient should resolve any subclinical elvin yadi infection.    12/05/2024 ID Update:  Patient contacted ENT office noting new drainage from right upper chest wound following Inspire device removal back in October 2024.  More recently patient is had Pseudomonas drainage from upper submandibular incision site.    I was in touch with the ENT service.  Need to be seen and if any chest or neck drainage then this needs to be cultured.  Cannot initiate mycobacterial treatment without antibiotic testing (particularly for clarithromycin ).  Patient add on ID clinic visit on 12/6/2024 at 9 AM for evaluation, culture of drainage fluid.    I also called the lab and and they will request antibiotic susceptibility testing on Mycobacterium chelonae isolated from 10/21/2024 device site.  Antibiotic testing at reference laboratory ([facility]) will take several weeks.    Empiric treatment of mycobacterial infection is not possible at this time given unpredictable antibiotic susceptibilities of these organisms and the usual need of 2-3 (sometimes 4) antimicrobial medications.    Today 12/6/2024:     See above summary of recent events     Prior and recent chin drainage resolved     Acute erythema and bloody drainage noted on  12/4/2024.  Had no antecedent symptoms like fever, chills, redness, or swelling.  On 12/4/2024 patient noted acute pruritus at the old incision site but no swelling or overt tenderness.  After work patient noted some bloody yellow drainage and applied a bandage.     In the last 48 hours there is been no significant increase and redness, itchiness, tenderness, or swelling.  Patient noticed significant amount of bloody drainage on his T-shirt when he awoke from sleep this morning.  There is been no associated odor.  No overt gross purulence described.  No adjacent or surrounding erythema, swelling over the upper chest.     Also there has been no erythema, redness, tenderness or swelling in the area  superior and tracking to the old submandibular incision.  Entire cervical triangle area and clavicular area has remained normal in color and without redness or swelling.      ALLERGIES:    No Known Allergies    MEDICATIONS:      Current Outpatient Medications:     cholecalciferol (Vitamin D3) 5,000 Units tablet, Take 1 tablet (5,000 Units) by mouth once daily., Disp: , Rfl:     oxyCODONE (Roxicodone) 5 mg immediate release tablet, Take 2 tablets (10 mg) by mouth every 6 hours if needed for severe pain (7 - 10). (Patient not taking: Reported on 11/20/2024), Disp: 24 tablet, Rfl: 0    pantoprazole (ProtoNix) 40 mg EC tablet, Take 1 tablet (40 mg) by mouth once daily in the morning. Take before meals. Do not crush, chew, or split. (Patient not taking: Reported on 11/20/2024), Disp: 30 tablet, Rfl: 0      PHYSICAL EXAMINATION:     Visit Vitals  Smoking Status Former   EXAM:   Patient is alert and oriented x 3  No acute distress  Well-developed and muscular torso  Several tattoos on the upper extremities     Right neck:     Right submandibular area without signs of erythema, swelling or tenderness     No right submandibular drainage    Right upper chest:     Rectangular 5 x 3 cm area of scarring and erythema surrounding prior  incision (overlying Inspire device removal site) -PHOTOGRAPH taken and included in media tab     The 10/21/2024 incision traversing the center of this erythematous scar region, is generally well-approximated with a few spots of eschar.  Most lateral aspect there is superficial wound separation of approximately 0.5 cm x 0.5 cm x 0.5 cm.  I did not palpate any adjacent subcutaneous or intramuscular fluid collection.  I did not express any significant amount of exudate or fluid.  There was a pea-sized amount of blood expressed which was sampled aseptically and sent for SURVEILLANCE culture.    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  In setting of new drainage, however I was concerned that there might be real mycobacterial infection, which would be unusual and rare but not unheard of.    #  11/20/24 Prior submandibular incision Pseudomonas stitch abscess       Treated and resolved    # 12/4/2024 new right upper chest (device site) extraction site wound dehiscence (rule out new bacterial infection for progressive NTM infection from prior positive result from 10/21/2024 showing 3 colonies Mycobacterium chelonae)      History:     Complete extraction of leads wires and device on 10/21/2024.  Treated with 2-week course of oral Augmentin after surgical removal. 10/21/24 fungal culture grew 3 colonies of rapid growing M. Chelonae, which I think is secondary contaminant and not a real infection.      Complicated by right submandibular incision/stitch abscess with Pseudomonas aeruginosa treated with 7-day course of oral ciprofloxacin around 11/20/2024      TODAY, 12/6/24:     12/4/24 developed acute onset right  lateral upper chest incision site drainage that is largely bloody and serous in nature.  No gross purulence described.  Stable surrounding scar tissue and mild chronic erythema.  No additional redness, swelling or signs of systemic infection.     It seems there has been some simple mechanical disruption of the right lateral incision.  No signs of overt infection at this time.  However given history of NTM growth from device site I was concerned about low-grade atypical NTM infection.  Clinically however this does not appear to be infected but patient might have some mechanical dehiscence.  Will monitor carefully.  Nonetheless I did send superficial surveillance culture of the small 0.5 cm wound on the lateral aspect a day healing incision.     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       I do not think patient has a progressive NTM infection.  Will try to rule out  with superficial culture that was collected aseptically.  In the meantime I will prescribe ciprofloxacin in case patient has secondary involvement from prior (although anatomically quite distant) Pseudomonas infection in the submandibular incision.    PLAN:  1. Begin ciprofloxacin 500 mg p.o. twice daily.  Duration to be determined pending culture results  2.  Follow-up on bacterial culture.  Phone follow-up regarding culture results  3.  Follow-up on AFB culture which will be intubated for up to 8 weeks  4.  Go to emergency department if there is additional redness, swelling, pain, pus or associated signs of systemic illness like fever, chills, rigors  5.  Phone follow-up for clinical updates on wound healing update  6.  ID clinic follow-up TBD as needed    Daniel Madden MD       I spent 60 minutes in the professional and overall care  of this patient.

## 2024-12-05 NOTE — TELEPHONE ENCOUNTER
Called patient because he reached out that there was some drainage in chest.  Patient denies significant redness or tenderness but scant bloody possibly purulent discharge from chest incision.  Patient reports this was noticed overnight. Patient  denies any further drainage or symptoms from neck incision.  Reports he has been dry and asymptomatic.  Patient also denies any systemic symptoms and reports to be doing well.     Patient initially reach out to have a extension of last prescribed ciprofloxacin.  At this point from a surgical standpoint there is not much to be offered.  Specifically, device was fully removed and accounted for.  Both of the leads were fully removed and patient no longer has foreign body from device.     Due to a typical acid-fast growth I need infectious disease to determine clinical course of action.  I will reach out to team and provider for clearance on recurring drainage.

## 2024-12-05 NOTE — PROGRESS NOTES
"Contacted by ENT office on 12/5/2024.  Patient contacted ENT office noting new drainage from right upper chest wound following Inspire device removal back in October 2024.  More recently patient is had Pseudomonas drainage from upper submandibular incision site.    Will offer patient add on ID clinic visit on 12/6/2024 at 9 AM for evaluation, culture of drainage fluid.    I also called the lab and and they will request antibiotic susceptibility testing on Mycobacterium chelonae isolated from 10/21/2024 device site.  Antibiotic testing at reference laboratory ([facility]) will take several weeks.  Empiric treatment of mycobacterial infection is not possible at this time given unpredictable antibiotic susceptibilities of these organisms and the usual need of 2-3 (sometimes 4) antimicrobial medications.    #####  Messaging to ENT sent 12/5/2024 follows:  \"Puja.  This is not good.  He had a few colonies of mycobacteria grow from the chest wound (back in October) and maybe this is real and now causing trouble.  If so, then this is a not a good situation since we do not have abx testing on the organism ( and testing which I will now request takes 2-3 weeks at reference lab).  If we are sascha maybe this is the pseudomonas back BUT now in a new area quite distal from the submandibular site (still a problem though).  If the chest is draining, he needs a culture and he needs to be seen (and may need a CT of the chest) I have an opening in my schedule tomorrow if I shuffle a few patients.  I will  have my office staff contact him for an appointment Friday 12/6/24 AT 9 AM (which is not negotiable) in [place] [address].\"  "

## 2024-12-06 ENCOUNTER — OFFICE VISIT (OUTPATIENT)
Dept: INFECTIOUS DISEASES | Facility: CLINIC | Age: 49
End: 2024-12-06
Payer: COMMERCIAL

## 2024-12-06 ENCOUNTER — TELEPHONE (OUTPATIENT)
Dept: OTOLARYNGOLOGY | Facility: CLINIC | Age: 49
End: 2024-12-06

## 2024-12-06 VITALS
SYSTOLIC BLOOD PRESSURE: 150 MMHG | DIASTOLIC BLOOD PRESSURE: 91 MMHG | TEMPERATURE: 97.7 F | BODY MASS INDEX: 31.37 KG/M2 | WEIGHT: 207 LBS | HEART RATE: 78 BPM | HEIGHT: 68 IN

## 2024-12-06 DIAGNOSIS — T81.49XA SURGICAL WOUND INFECTION: Primary | ICD-10-CM

## 2024-12-06 DIAGNOSIS — T81.30XA WOUND DEHISCENCE: ICD-10-CM

## 2024-12-06 LAB — HOLD SPECIMEN: NORMAL

## 2024-12-06 PROCEDURE — 87116 MYCOBACTERIA CULTURE: CPT

## 2024-12-06 PROCEDURE — 99215 OFFICE O/P EST HI 40 MIN: CPT | Performed by: INTERNAL MEDICINE

## 2024-12-06 PROCEDURE — 87206 SMEAR FLUORESCENT/ACID STAI: CPT

## 2024-12-06 PROCEDURE — 87075 CULTR BACTERIA EXCEPT BLOOD: CPT

## 2024-12-06 PROCEDURE — 87015 SPECIMEN INFECT AGNT CONCNTJ: CPT

## 2024-12-06 PROCEDURE — 87070 CULTURE OTHR SPECIMN AEROBIC: CPT

## 2024-12-06 PROCEDURE — 3008F BODY MASS INDEX DOCD: CPT | Performed by: INTERNAL MEDICINE

## 2024-12-06 PROCEDURE — 87205 SMEAR GRAM STAIN: CPT

## 2024-12-06 RX ORDER — CIPROFLOXACIN 500 MG/1
500 TABLET ORAL 2 TIMES DAILY
Qty: 60 TABLET | Refills: 0 | Status: SHIPPED | OUTPATIENT
Start: 2024-12-06 | End: 2025-01-05

## 2024-12-06 ASSESSMENT — PATIENT HEALTH QUESTIONNAIRE - PHQ9
1. LITTLE INTEREST OR PLEASURE IN DOING THINGS: NOT AT ALL
2. FEELING DOWN, DEPRESSED OR HOPELESS: NOT AT ALL
SUM OF ALL RESPONSES TO PHQ9 QUESTIONS 1 & 2: 0

## 2024-12-06 NOTE — LETTER
12/06/24    Obie Thompson MD  [address] [facility] [address]      Dear Dr. Obie Thompson MD,    I am writing to confirm that your patient, Lul Lizama, received care in my office on 12/06/24. I have enclosed a summary of the care provided to Lul for your reference.    Please contact me with any questions you may have regarding the visit.    Sincerely,         Daniel Madden MD  [address] [address]  [facility] [address]    CC: No Recipients

## 2024-12-06 NOTE — TELEPHONE ENCOUNTER
Topic: chest incision drainage    Pt called Dr Sims's office yesterday morning:   Patient said his incision on his chest is leaking. He mentioned his incision on his neck leaked and was given an antibiotic but he is all out. He is wondering if he can get another refill .     Pt is sp Inspire Explant   Neck erythema and drainage started on 11/20 -pt placed on Augmentin which was discontinued after 3 days. Pt then started on Cipro 11/24 for 7 days,     I called pt yesterday morning and he states it started 12/4 with discomfort in the chest incision and then one spot turned red and leaked the same pink/tan fluid as his neck did on 11/20. Pt states today that the redness and drainage on his chest incision is gone.   Pt denies fevers     Dr Sims and Dr Madden aware of the above and Dr Madden will see the patient on 12/6/2024

## 2024-12-06 NOTE — LETTER
12/06/24    Laureano Skinner MD  [address] [facility] [address]  [place] [address]      Dear Dr. Laureano Skinner MD,    Thank you for referring your patient, Lul Lizama, to receive care in my office. I have enclosed a summary of the care provided to Lul on 12/06/24.    Please contact me with any questions you may have regarding the visit.    Sincerely,         Daniel Madden MD  [address]  [facility] [address]  [facility] [address]    CC: No Recipients

## 2024-12-08 LAB
BACTERIA SPEC CULT: NORMAL
GRAM STN SPEC: NORMAL
GRAM STN SPEC: NORMAL

## 2024-12-09 ENCOUNTER — DOCUMENTATION (OUTPATIENT)
Dept: IMMUNOLOGY | Facility: CLINIC | Age: 49
End: 2024-12-09
Payer: COMMERCIAL

## 2024-12-09 DIAGNOSIS — A31.1 MYCOBACTERIUM CHELONAE INFECTION OF SKIN: Primary | ICD-10-CM

## 2024-12-09 LAB — HOLD SPECIMEN: NORMAL

## 2024-12-09 NOTE — PROGRESS NOTES
12/09/2024 ID update  Culture from 1cm upper chest wound is NEGATIVE.  So, the wound opening is likely from mechanical stress.  Also, prior 10/21/24 culture had shown 3 colonies of M. chelonae, which I think was a contaminant.  However, if this is a elvin yadi nontuberculous mycobacterial infection, the current culture may not show organisms for several weeks.  So, will monitor carefully.     Patient should keep clean and dry with bandaging and might also consider applying Steri-strips to keep tissue approximated.  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  He should avoid heavy lifting and repetitive strain if possible.  Not sure if he can do lighter duty at work.  If needed, I can provide a work letter for lighter duty if needed.   Patient should keep in close contact with ENT-surgery as well.      10:56 AM: I spoke to the patient regarding wound.  Patient reports less drainage/bleeding.  No concerns for progression at this time.  Discussed that AFB culture will take several weeks to rule out NTM infection.  Hopeful that this was just a contaminant on the 10/21/2024 culture.  Phone follow-up in 1 week regarding update or earlier if necessary

## 2024-12-11 ENCOUNTER — APPOINTMENT (OUTPATIENT)
Dept: RADIOLOGY | Facility: HOSPITAL | Age: 49
End: 2024-12-11
Payer: COMMERCIAL

## 2024-12-11 ENCOUNTER — HOSPITAL ENCOUNTER (EMERGENCY)
Facility: HOSPITAL | Age: 49
Discharge: HOME | End: 2024-12-11
Attending: STUDENT IN AN ORGANIZED HEALTH CARE EDUCATION/TRAINING PROGRAM
Payer: COMMERCIAL

## 2024-12-11 VITALS
WEIGHT: 200 LBS | HEIGHT: 68 IN | RESPIRATION RATE: 16 BRPM | BODY MASS INDEX: 30.31 KG/M2 | HEART RATE: 84 BPM | OXYGEN SATURATION: 98 % | SYSTOLIC BLOOD PRESSURE: 156 MMHG | TEMPERATURE: 98.1 F | DIASTOLIC BLOOD PRESSURE: 92 MMHG

## 2024-12-11 DIAGNOSIS — R25.2 MUSCLE CRAMPS: Primary | ICD-10-CM

## 2024-12-11 LAB
ACID FAST STN SPEC: NORMAL
MYCOBACTERIUM SPEC CULT: NORMAL

## 2024-12-11 PROCEDURE — 99284 EMERGENCY DEPT VISIT MOD MDM: CPT | Mod: 25 | Performed by: STUDENT IN AN ORGANIZED HEALTH CARE EDUCATION/TRAINING PROGRAM

## 2024-12-11 PROCEDURE — 93971 EXTREMITY STUDY: CPT

## 2024-12-11 PROCEDURE — 2500000001 HC RX 250 WO HCPCS SELF ADMINISTERED DRUGS (ALT 637 FOR MEDICARE OP): Performed by: STUDENT IN AN ORGANIZED HEALTH CARE EDUCATION/TRAINING PROGRAM

## 2024-12-11 PROCEDURE — 93971 EXTREMITY STUDY: CPT | Performed by: RADIOLOGY

## 2024-12-11 RX ORDER — IBUPROFEN 400 MG/1
400 TABLET ORAL ONCE
Status: COMPLETED | OUTPATIENT
Start: 2024-12-11 | End: 2024-12-11

## 2024-12-11 NOTE — DISCHARGE INSTRUCTIONS
You diagnosed with what we suspect is muscle cramps I do recommend balanced diet adequate rest hydration alternating Tylenol Motrin as needed for mild to moderate pain.  Rest ice compression elevation.  Please follow-up with your primary physician in coming days to ensure improvement and resolution of symptoms.  If you persistently have symptoms ultrasound imaging may need to be repeated.  She has been experiencing pain out of proportion numbness tingling weakness symptoms concerning to call 911 or return to the nearest emergency department immediately.

## 2024-12-11 NOTE — ED TRIAGE NOTES
PATIENT STATES LEFT CALF SWELLING SINCE SUNDAY, PAINFUL TO WALK ON/EXTEND. NO RECENT TRAVEL, BUT DID HAVE SURGERY X1 MONTH AGO. PATIENT DENIES ANY REDNESS/SWELLING, BUT STATES IT IS PAINFUL TO THE TOUCH.

## 2024-12-11 NOTE — ED PROVIDER NOTES
EMERGENCY DEPARTMENT ENCOUNTER      Pt Name: Lul Lizama  MRN: [id number]  Birthdate 1975  Date of evaluation: 12/11/2024  Provider: Robin Johnson DO    CHIEF COMPLAINT       Chief Complaint   Patient presents with    Leg Swelling       HISTORY OF PRESENT ILLNESS    Lul Lizama is a 49 y.o. adult who presents to the emergency department with Significant other for left lower extremity aching/cramping.  Patient states this started over the weekend.  He does note that this occurred after going for a long walk outdoors.  He states that he did have a recent surgery on his chest.  Had a prolonged hospital stay.  Denies any history of coagulopathies or recent long travel.  He has no associated swelling in the left lower extremity no associated chest pain or shortness of breath.  Due to the persistent cramping he is presenting for further evaluation.  He does note when he took Motrin 2 days ago pain resolved completely.  He has still been ambulatory.          Nursing Notes were reviewed.    REVIEW OF SYSTEMS     CONSTITUTIONAL: Denies fever, sweats, chills.   NEURO: Denies difficulty walking, numbness, weakness, tingling, headache.   HEENT: Denies sore throat, rhinorrhea, changes in vision.   CARDIO: Denies chest pain, palpitations.  PULM: Denies shortness of breath, cough.   GI: Denies abdominal pain, nausea, vomiting, diarrhea, constipation, melena, hematochezia.  : Denies painful urination, frequency, hematuria.   MSK: Endorses calf cramping.  SKIN: Denies rash, lesions.   ENDOCRINE: Denies unexpected weight-loss.   HEME: Denies bleeding disorder.     PAST MEDICAL HISTORY   No past medical history on file.  Recent surgery, sleep apnea  SURGICAL HISTORY     No past surgical history on file.    ALLERGIES     Patient has no known allergies.    FAMILY HISTORY     No family history on file.     SOCIAL HISTORY       Social History     Socioeconomic History    Marital status:    Tobacco Use    Smoking  status: Former     Current packs/day: 0.50     Average packs/day: 0.5 packs/day for 10.0 years (5.0 ttl pk-yrs)     Types: Cigarettes    Smokeless tobacco: Former     Types: Chew   Substance and Sexual Activity    Alcohol use: Yes     Alcohol/week: 2.0 standard drinks of alcohol     Types: 2 Standard drinks or equivalent per week     Comment: occasional    Drug use: Never     Social Drivers of Health     Financial Resource Strain: Low Risk  (10/21/2024)    Overall Financial Resource Strain (CARDIA)     Difficulty of Paying Living Expenses: Not hard at all   Food Insecurity: No Food Insecurity (11/8/2024)    Hunger Vital Sign     Worried About Running Out of Food in the Last Year: Never true     Ran Out of Food in the Last Year: Never true   Transportation Needs: No Transportation Needs (10/21/2024)    PRAPARE - Transportation     Lack of Transportation (Medical): No     Lack of Transportation (Non-Medical): No   Stress: Patient Declined (10/21/2024)    [employer] [place] [employer] - Occupational Stress Questionnaire     Feeling of Stress : Patient declined   Intimate Partner Violence: Not At Risk (10/21/2024)    Humiliation, Afraid, Rape, and Kick questionnaire     Fear of Current or Ex-Partner: No     Emotionally Abused: No     Physically Abused: No     Sexually Abused: No   Housing Stability: Low Risk  (10/21/2024)    Housing Stability Vital Sign     Unable to Pay for Housing in the Last Year: No     Number of Times Moved in the Last Year: 0     Homeless in the Last Year: No       PHYSICAL EXAM   VS: As documented in the triage note from today's date and EMR flowsheet were reviewed.  Gen: Well developed. No acute distress. Seated in bed. Appears nontoxic.   Skin: Warm. Dry. Intact. No rashes or lesions.  Eyes: Pupils equally round and reactive to light. Clear sclera.   HENT: Atraumatic appearance. Mucosal membranes moist. No oral lesions, uvula midline, airway patent.   CV: Regular rate and regular  "rhythm. S1, S2. No pedal edema. Warm extremities.  Resp: Nonlabored breathing Clear to auscultation bilaterally. No increased work of breathing.   GI: Soft and nontender. No rebound or guarding. Bowel sounds x4 present.   MSK: Symmetric muscle bulk. No joint swelling in the extremities. Compartments are soft. Neurovascularly intact x4 extremities. Radial pulses +2 equal bilaterally.  Pedal pulses +2 equal bilaterally.  Reproducible left calf tenderness no appreciable swelling 5 out of 5 strength at the hip knee ankle toe joints.  Achilles patellar reflexes +2 equal bilaterally.  No pain at the insertion of the Achilles.  No fibular head tenderness no navicular pain no base of the fifth metatarsal pain.  Neuro: Alert. Speech fluent. Moving all extremities. No focal deficits. Gait normal.  Psych: Appropriate. K[place].    DIAGNOSTIC RESULTS   RADIOLOGY:   Non-plain film images such as CT, Ultrasound and MRI are read by the radiologist. Plain radiographic images are visualized and preliminarily interpreted by the emergency physician with the below findings:      Interpretation per the Radiologist below, if available at the time of this note:    Lower extremity venous duplex left   Final Result   No sonographic evidence of left lower extremity deep vein thrombosis.        MACRO:   None        Signed by: Otto Lagunas 12/11/2024 4:12 PM   Dictation workstation:   [id number]            ED BEDSIDE ULTRASOUND:   Performed by ED Physician - none    LABS:  Labs Reviewed - No data to display    All other labs were within normal range or not returned as of this dictation.    EMERGENCY DEPARTMENT COURSE/MDM:   Vitals:    Vitals:    12/11/24 1514 12/11/24 1645   BP: (!) 192/101 (!) 156/92   Pulse: 77 84   Resp: 15 16   Temp: 36.7 °C (98.1 °F)    SpO2: 98% 98%   Weight: 90.7 kg (200 lb)    Height: 1.727 m (5' 8\")        I reviewed the patient's triage vitals and they are hypertensive recommend follow-up with primary physician for " repeat checks.    Due to the above findings the following was ordered Motrin for discomfort duplex of the left lower extremity.    Ultrasound imaging is negative for DVT I see no indication for x-ray imaging at this time there was no apparent injury low concern for fracture.  Suspect muscle cramping at this time patient is recommended rest ice compression elevation Tylenol Motrin alternating as needed for mild or moderate pain close follow-up with his primary physician.  He is aware that he may need a repeat ultrasound if symptoms persist.  He is appreciative of care discharged in stable condition.    Diagnoses as of 12/11/24 2012   Muscle cramps       Patient was counseled regarding labs, imaging, likely diagnosis, and plan. All questions were answered.     ------------------------------------------------------------------  Information provided by the patient  Past medical history complicating workup recent surgery  Previous medical records reviewed hospital admission 10/20/2024  Considered x-ray imaging although no indication.  Shared medical decision making patient agreeable with close outpatient follow-up with primary provider.  ------------------------------------------------------------------  ED Medications administered this visit:    Medications   ibuprofen tablet 400 mg (400 mg oral Given 12/11/24 5260)       New Prescriptions from this visit:    Discharge Medication List as of 12/11/2024  4:22 PM          Follow-up:  Obie Thompson MD  [address] [facility] [address] [phone]    Schedule an appointment as soon as possible for a visit in 2 days      [facility]  [address] [place] [id number] [phone]  Go to   If symptoms worsen        Final Impression:   1. Muscle cramps          Robin Johnson DO    (Please note that portions of this note were completed with a voice recognition program.  Efforts were made to edit the dictations but  occasionally words are mis-transcribed.)     Robin Johnson DO  12/11/24 2012

## 2024-12-13 LAB — FUNGUS SPEC FUNGUS STN: NORMAL

## 2024-12-18 LAB
ACID FAST STN SPEC: NORMAL
MYCOBACTERIUM SPEC CULT: NORMAL

## 2024-12-20 ENCOUNTER — APPOINTMENT (OUTPATIENT)
Dept: OPHTHALMOLOGY | Facility: CLINIC | Age: 49
End: 2024-12-20
Payer: COMMERCIAL

## 2024-12-24 LAB
ACID FAST STN SPEC: NORMAL
MYCOBACTERIUM SPEC CULT: NORMAL

## 2024-12-26 ENCOUNTER — APPOINTMENT (OUTPATIENT)
Dept: PRIMARY CARE | Facility: CLINIC | Age: 49
End: 2024-12-26
Payer: COMMERCIAL

## 2024-12-26 VITALS
HEART RATE: 79 BPM | OXYGEN SATURATION: 98 % | SYSTOLIC BLOOD PRESSURE: 158 MMHG | HEIGHT: 68 IN | DIASTOLIC BLOOD PRESSURE: 110 MMHG | BODY MASS INDEX: 30.41 KG/M2 | TEMPERATURE: 98.4 F

## 2024-12-26 DIAGNOSIS — N17.9 ACUTE RENAL FAILURE, UNSPECIFIED ACUTE RENAL FAILURE TYPE (CMS-HCC): ICD-10-CM

## 2024-12-26 DIAGNOSIS — M25.572 ACUTE LEFT ANKLE PAIN: Primary | ICD-10-CM

## 2024-12-26 DIAGNOSIS — I10 BENIGN ESSENTIAL HYPERTENSION: ICD-10-CM

## 2024-12-26 DIAGNOSIS — M67.874 ACHILLES TENDINOSIS OF LEFT ANKLE: ICD-10-CM

## 2024-12-26 LAB
NON-UH HIE APPEARANCE, U: CLEAR
NON-UH HIE BILIRUBIN, U: NEGATIVE
NON-UH HIE BLOOD, U: NEGATIVE
NON-UH HIE BUN/CREAT RATIO: 7.9
NON-UH HIE BUN: 11 MG/DL (ref 9–23)
NON-UH HIE CALCIUM: 9.5 MG/DL (ref 8.7–10.4)
NON-UH HIE CALCULATED OSMOLALITY: 282 MOSM/KG (ref 275–295)
NON-UH HIE CHLORIDE: 105 MMOL/L (ref 98–107)
NON-UH HIE CO2, VENOUS: 31 MMOL/L (ref 20–31)
NON-UH HIE COLOR, U: NORMAL
NON-UH HIE CREATININE: 1.4 MG/DL (ref 0.6–1.1)
NON-UH HIE GFR AA: >60
NON-UH HIE GLOMERULAR FILTRATION RATE: 54 ML/MIN/1.73M?
NON-UH HIE GLUCOSE QUAL, U: NEGATIVE
NON-UH HIE GLUCOSE: 94 MG/DL (ref 74–106)
NON-UH HIE HCT: 46.3 % (ref 41–52)
NON-UH HIE HGB: 15.5 G/DL (ref 13.5–17.5)
NON-UH HIE INSTR WBC ND: 4.7
NON-UH HIE K: 4.3 MMOL/L (ref 3.5–5.1)
NON-UH HIE KETONES, U: NEGATIVE
NON-UH HIE LEUKOCYTE ESTERASE, U: NEGATIVE
NON-UH HIE MCH: 29.2 PG (ref 27–34)
NON-UH HIE MCHC: 33.5 G/DL (ref 32–37)
NON-UH HIE MCV: 87 FL (ref 80–100)
NON-UH HIE MPV: 6.9 FL (ref 7.4–10.4)
NON-UH HIE NA: 142 MMOL/L (ref 135–145)
NON-UH HIE NITRITE, U: NEGATIVE
NON-UH HIE PH, U: 7.5 (ref 4.5–8)
NON-UH HIE PLATELET: 171 X10 (ref 150–450)
NON-UH HIE PROTEIN, U: NEGATIVE
NON-UH HIE RBC: 5.32 X10 (ref 4.7–6.1)
NON-UH HIE RDW: 14.1 % (ref 11.5–14.5)
NON-UH HIE SPECIFIC GRAVITY, U: 1.02 (ref 1–1.03)
NON-UH HIE U MICRO: NORMAL
NON-UH HIE URIC ACID: 5.2 MG/DL (ref 3.7–9.2)
NON-UH HIE UROBILINOGEN QUAL, U: NORMAL
NON-UH HIE WBC: 4.7 X10 (ref 4.5–11)

## 2024-12-26 PROCEDURE — 3080F DIAST BP >= 90 MM HG: CPT | Performed by: FAMILY MEDICINE

## 2024-12-26 PROCEDURE — 3077F SYST BP >= 140 MM HG: CPT | Performed by: FAMILY MEDICINE

## 2024-12-26 PROCEDURE — 99214 OFFICE O/P EST MOD 30 MIN: CPT | Performed by: FAMILY MEDICINE

## 2024-12-26 PROCEDURE — 1036F TOBACCO NON-USER: CPT | Performed by: FAMILY MEDICINE

## 2024-12-26 NOTE — PROGRESS NOTES
Subjective   Patient ID: Lul Lizama is a 49 y.o. adult who presents for Leg Pain and Ankle Pain.    Assessment/Plan     Problem List Items Addressed This Visit       Benign essential hypertension     Other Visit Diagnoses       Acute left ankle pain    -  Primary    Relevant Orders    XR ankle left 3+ views    CBC    Basic Metabolic Panel    Uric acid    Urinalysis with Reflex Microscopic    Achilles tendinosis of left ankle        Relevant Orders    XR ankle left 3+ views    US MSK upper extremity joints tendons muscles    Acute renal failure, unspecified acute renal failure type (CMS-HCC)        Relevant Orders    CBC    Basic Metabolic Panel    Uric acid    Urinalysis with Reflex Microscopic            HPI    49-year-old male complaining of left leg pain going on since last few weeks now more localized to left ankle just above the ankle ankle with left ankle swelling    Patient had a recent inspire placement with infection status post removal was on Cipro   Patient does not remember any injury  Pain is present particularly with left ankle extension    Patient works out pretty heavily also has a high protein diet    Recent lab work reviewed had a renal insufficiency  Will recheck lab work  Will consider left ankle x-ray and MSK ultrasound    Achilles tendinosis versus rupture  We may consider podiatry consult  Advised to monitor blood pressure at home  Schedule physical next month may have to start patient on antihypertensive    No Known Allergies    Current Outpatient Medications   Medication Sig Dispense Refill    cholecalciferol (Vitamin D3) 5,000 Units tablet Take 1 tablet (5,000 Units) by mouth once daily.      ciprofloxacin (Cipro) 500 mg tablet Take 1 tablet (500 mg) by mouth 2 times a day. (Patient not taking: Reported on 12/26/2024) 60 tablet 0    oxyCODONE (Roxicodone) 5 mg immediate release tablet Take 2 tablets (10 mg) by mouth every 6 hours if needed for severe pain (7 - 10). (Patient not taking:  "Reported on 12/26/2024) 24 tablet 0    pantoprazole (ProtoNix) 40 mg EC tablet Take 1 tablet (40 mg) by mouth once daily in the morning. Take before meals. Do not crush, chew, or split. (Patient not taking: Reported on 12/26/2024) 30 tablet 0     No current facility-administered medications for this visit.       Objective   Visit Vitals  BP (!) 158/110   Pulse 79   Temp 36.9 °C (98.4 °F)   Ht 1.727 m (5' 8\")   SpO2 98%   BMI 30.41 kg/m²   Smoking Status Former   BSA 2.09 m²     Physical Exam  Constitutional:       General: He is not in acute distress.     Appearance: Normal appearance.   HENT:      Head: Normocephalic and atraumatic.      Nose: Nose normal.   Eyes:      Extraocular Movements: Extraocular movements intact.      Conjunctiva/sclera: Conjunctivae normal.   Cardiovascular:      Rate and Rhythm: Normal rate and regular rhythm.   Pulmonary:      Effort: Pulmonary effort is normal.      Breath sounds: Normal breath sounds.   Skin:     General: Skin is warm.   Neurological:      Mental Status: He is alert and oriented to person, place, and time.   Psychiatric:         Mood and Affect: Mood normal.         Behavior: Behavior normal.     Left ankle swelling present with tenderness about left ankle on posterior side  Left left ankle extension painful  Immunization History   Administered Date(s) Administered    Tdap vaccine, age 7 year and older (BOOSTRIX, ADACEL) 07/12/2016       Review of Systems    Office Visit on 12/06/2024   Component Date Value Ref Range Status    Tissue/Wound Culture/Smear 12/06/2024 No growth aerobically and anaerobically   Final    Gram Stain 12/06/2024 (4+) Abundant Polymorphonuclear leukocytes   Final    Gram Stain 12/06/2024 No organisms seen   Final    Extra Tube 12/06/2024 Hold for add-ons.   Final    AFB Culture 12/06/2024 Culture in progress and will be examined weekly. A result will be issued either when positive or after 8 weeks incubation.   Preliminary    AFB Stain 12/06/2024 " No acid fast bacilli seen   Preliminary    Extra Tube 12/06/2024 Hold for add-ons.   Final   Office Visit on 11/20/2024   Component Date Value Ref Range Status    Tissue/Wound Culture/Smear 11/20/2024 (1+) Rare Pseudomonas aeruginosa (A)   Final    Tissue/Wound Culture/Smear 11/20/2024 (2+) Few Mixed Gram-Positive and Gram-Negative Bacteria   Final    Gram Stain 11/20/2024 (3+) Moderate Polymorphonuclear leukocytes (A)   Final    Gram Stain 11/20/2024 (1+) Rare Gram positive cocci (A)   Final    Extra Tube 11/20/2024 Hold for add-ons.   Final   Admission on 10/21/2024, Discharged on 10/22/2024   Component Date Value Ref Range Status    ABO TYPE 10/21/2024 O   Final    Rh TYPE 10/21/2024 POS   Final    ANTIBODY SCREEN 10/21/2024 NEG   Final    Fungal Smear 10/21/2024 No fungal elements seen   Preliminary    Tissue/Wound Culture/Smear 10/21/2024 (1+) Rare Mixed Gram-Positive and Gram-Negative Bacteria   Final    Gram Stain 10/21/2024 (3+) Moderate Polymorphonuclear leukocytes   Final    Gram Stain 10/21/2024 No organisms seen   Final   Admission on 10/11/2024, Discharged on 10/17/2024   Component Date Value Ref Range Status    WBC 10/11/2024 5.0  4.4 - 11.3 x10*3/uL Final    nRBC 10/11/2024 0.0  0.0 - 0.0 /100 WBCs Final    RBC 10/11/2024 5.17  4.00 - 5.90 x10*6/uL Final    Hemoglobin 10/11/2024 15.3  12.0 - 17.5 g/dL Final    Hematocrit 10/11/2024 44.9  36.0 - 52.0 % Final    MCV 10/11/2024 87  80 - 100 fL Final    MCH 10/11/2024 29.6  26.0 - 34.0 pg Final    MCHC 10/11/2024 34.1  32.0 - 36.0 g/dL Final    RDW 10/11/2024 12.4  11.5 - 14.5 % Final    Platelets 10/11/2024 165  150 - 450 x10*3/uL Final    Protime 10/11/2024 11.4  9.8 - 12.8 seconds Final    INR 10/11/2024 1.0  0.9 - 1.1 Final    aPTT 10/11/2024 32  27 - 38 seconds Final    Glucose 10/11/2024 120 (H)  74 - 99 mg/dL Final    Sodium 10/11/2024 139  136 - 145 mmol/L Final    Potassium 10/11/2024 3.7  3.5 - 5.3 mmol/L Final    Chloride 10/11/2024 104  98 -  107 mmol/L Final    Bicarbonate 10/11/2024 26  21 - 32 mmol/L Final    Anion Gap 10/11/2024 13  10 - 20 mmol/L Final    Urea Nitrogen 10/11/2024 29 (H)  6 - 23 mg/dL Final    Creatinine 10/11/2024 1.30  0.50 - 1.30 mg/dL Final    eGFR 10/11/2024 51 (L)  >60 mL/min/1.73m*2 Final    Calcium 10/11/2024 9.2  8.6 - 10.6 mg/dL Final    Magnesium 10/11/2024 2.11  1.60 - 2.40 mg/dL Final    ABO TYPE 10/11/2024 O   Final    Rh TYPE 10/11/2024 POS   Final    ANTIBODY SCREEN 10/11/2024 NEG   Final    Vancomycin 10/13/2024 25.9 (H)  5.0 - 20.0 ug/mL Final    Tissue/Wound Culture/Smear 10/13/2024 (4+) Abundant Mixed Anaerobic Bacteria   Final    Beta Lactamase (Cefinase) 10/13/2024 Positive   Final    Gram Stain 10/13/2024 (4+) Abundant Polymorphonuclear leukocytes (A)   Final    Gram Stain 10/13/2024 (3+) Moderate Gram positive cocci (A)   Final    WBC 10/14/2024 6.6  4.4 - 11.3 x10*3/uL Final    nRBC 10/14/2024 0.0  0.0 - 0.0 /100 WBCs Final    RBC 10/14/2024 4.68  4.00 - 5.90 x10*6/uL Final    Hemoglobin 10/14/2024 14.5  12.0 - 17.5 g/dL Final    Hematocrit 10/14/2024 41.5  36.0 - 52.0 % Final    MCV 10/14/2024 89  80 - 100 fL Final    MCH 10/14/2024 31.0  26.0 - 34.0 pg Final    MCHC 10/14/2024 34.9  32.0 - 36.0 g/dL Final    RDW 10/14/2024 12.6  11.5 - 14.5 % Final    Platelets 10/14/2024 160  150 - 450 x10*3/uL Final    Glucose 10/15/2024 116 (H)  74 - 99 mg/dL Final    Sodium 10/15/2024 139  136 - 145 mmol/L Final    Potassium 10/15/2024 4.1  3.5 - 5.3 mmol/L Final    Chloride 10/15/2024 102  98 - 107 mmol/L Final    Bicarbonate 10/15/2024 31  21 - 32 mmol/L Final    Anion Gap 10/15/2024 10  10 - 20 mmol/L Final    Urea Nitrogen 10/15/2024 13  6 - 23 mg/dL Final    Creatinine 10/15/2024 1.77 (H)  0.50 - 1.30 mg/dL Final    eGFR 10/15/2024 47 (L)  >60 mL/min/1.73m*2 Final    Calcium 10/15/2024 9.2  8.6 - 10.6 mg/dL Final    Phosphorus 10/15/2024 3.7  2.5 - 4.9 mg/dL Final    Albumin 10/15/2024 3.9  3.4 - 5.0 g/dL Final     WBC 10/15/2024 5.4  4.4 - 11.3 x10*3/uL Final    nRBC 10/15/2024 0.0  0.0 - 0.0 /100 WBCs Final    RBC 10/15/2024 4.46  4.00 - 5.90 x10*6/uL Final    Hemoglobin 10/15/2024 13.9  12.0 - 17.5 g/dL Final    Hematocrit 10/15/2024 40.3  36.0 - 52.0 % Final    MCV 10/15/2024 90  80 - 100 fL Final    MCH 10/15/2024 31.2  26.0 - 34.0 pg Final    MCHC 10/15/2024 34.5  32.0 - 36.0 g/dL Final    RDW 10/15/2024 12.1  11.5 - 14.5 % Final    Platelets 10/15/2024 145 (L)  150 - 450 x10*3/uL Final    Magnesium 10/15/2024 2.02  1.60 - 2.40 mg/dL Final    Glucose 10/16/2024 94  74 - 99 mg/dL Final    Sodium 10/16/2024 139  136 - 145 mmol/L Final    Potassium 10/16/2024 3.9  3.5 - 5.3 mmol/L Final    Chloride 10/16/2024 103  98 - 107 mmol/L Final    Bicarbonate 10/16/2024 27  21 - 32 mmol/L Final    Anion Gap 10/16/2024 13  10 - 20 mmol/L Final    Urea Nitrogen 10/16/2024 15  6 - 23 mg/dL Final    Creatinine 10/16/2024 1.59 (H)  0.50 - 1.30 mg/dL Final    eGFR 10/16/2024 53 (L)  >60 mL/min/1.73m*2 Final    Calcium 10/16/2024 9.1  8.6 - 10.6 mg/dL Final    Phosphorus 10/16/2024 3.6  2.5 - 4.9 mg/dL Final    Albumin 10/16/2024 4.0  3.4 - 5.0 g/dL Final    WBC 10/16/2024 6.0  4.4 - 11.3 x10*3/uL Final    nRBC 10/16/2024 0.0  0.0 - 0.0 /100 WBCs Final    RBC 10/16/2024 4.64  4.00 - 5.90 x10*6/uL Final    Hemoglobin 10/16/2024 14.5  12.0 - 17.5 g/dL Final    Hematocrit 10/16/2024 41.7  36.0 - 52.0 % Final    MCV 10/16/2024 90  80 - 100 fL Final    MCH 10/16/2024 31.3  26.0 - 34.0 pg Final    MCHC 10/16/2024 34.8  32.0 - 36.0 g/dL Final    RDW 10/16/2024 12.1  11.5 - 14.5 % Final    Platelets 10/16/2024 167  150 - 450 x10*3/uL Final    Magnesium 10/16/2024 2.04  1.60 - 2.40 mg/dL Final    Vancomycin 10/16/2024 11.8  5.0 - 20.0 ug/mL Final    Glucose 10/17/2024 112 (H)  74 - 99 mg/dL Final    Sodium 10/17/2024 140  136 - 145 mmol/L Final    Potassium 10/17/2024 3.8  3.5 - 5.3 mmol/L Final    Chloride 10/17/2024 103  98 - 107 mmol/L Final     Bicarbonate 10/17/2024 31  21 - 32 mmol/L Final    Anion Gap 10/17/2024 10  10 - 20 mmol/L Final    Urea Nitrogen 10/17/2024 18  6 - 23 mg/dL Final    Creatinine 10/17/2024 1.79 (H)  0.50 - 1.30 mg/dL Final    eGFR 10/17/2024 46 (L)  >60 mL/min/1.73m*2 Final    Calcium 10/17/2024 9.1  8.6 - 10.6 mg/dL Final    Phosphorus 10/17/2024 4.3  2.5 - 4.9 mg/dL Final    Albumin 10/17/2024 4.0  3.4 - 5.0 g/dL Final    WBC 10/17/2024 4.8  4.4 - 11.3 x10*3/uL Final    nRBC 10/17/2024 0.0  0.0 - 0.0 /100 WBCs Final    RBC 10/17/2024 4.57  4.00 - 5.90 x10*6/uL Final    Hemoglobin 10/17/2024 14.5  12.0 - 17.5 g/dL Final    Hematocrit 10/17/2024 42.1  36.0 - 52.0 % Final    MCV 10/17/2024 92  80 - 100 fL Final    MCH 10/17/2024 31.7  26.0 - 34.0 pg Final    MCHC 10/17/2024 34.4  32.0 - 36.0 g/dL Final    RDW 10/17/2024 12.2  11.5 - 14.5 % Final    Platelets 10/17/2024 154  150 - 450 x10*3/uL Final    Magnesium 10/17/2024 2.11  1.60 - 2.40 mg/dL Final    Vancomycin 10/17/2024 18.1  5.0 - 20.0 ug/mL Final    Tissue/Wound Culture/Smear 10/17/2024 No growth aerobically and anaerobically   Final    Gram Stain 10/17/2024 No polymorphonuclear leukocytes seen   Final    Gram Stain 10/17/2024 No organisms seen   Final       Radiology: Reviewed imaging in powerchart.  Lower extremity venous duplex left    Result Date: 12/11/2024  Interpreted By:  Otto Lagunas, STUDY: [facility] US LOWER EXTREMITY VENOUS DUPLEX LEFT;  12/11/2024 3:56 pm   INDICATION: Signs/Symptoms:Leg pain and cramping.   COMPARISON: None.   ACCESSION NUMBER(S): [id number]   ORDERING CLINICIAN: JOSE OTTO   TECHNIQUE: Ultrasound of the left lower extremity deep venous system was performed from the inguinal ligament into the  calf using gray scale imaging, spectral doppler, and color doppler. Vessels imaged include: common femoral vein, proximal deep femoral vein, proximal greater saphenous vein,  proximal, mid, and distal  superficial femoral vein, popliteal vein, and  the posterior tibial and peroneal veins in the calf.   The right CFV was also imaged.   FINDINGS: Respiratory variation, flow, augmentation, and compressibility are preserved throughout.  There is no sonographic evidence of deep vein thrombosis in this exam.   Imaging of the right common femoral vein was unremarkable.       No sonographic evidence of left lower extremity deep vein thrombosis.   MACRO: None   Signed by: Otto Lagunas 12/11/2024 4:12 PM Dictation workstation:   [id number]      No family history on file.  Social History     Socioeconomic History    Marital status:    Tobacco Use    Smoking status: Former     Current packs/day: 0.50     Average packs/day: 0.5 packs/day for 10.0 years (5.0 ttl pk-yrs)     Types: Cigarettes    Smokeless tobacco: Former     Types: Chew   Substance and Sexual Activity    Alcohol use: Yes     Alcohol/week: 2.0 standard drinks of alcohol     Types: 2 Standard drinks or equivalent per week     Comment: occasional    Drug use: Never     Social Drivers of Health     Financial Resource Strain: Low Risk  (10/21/2024)    Overall Financial Resource Strain (CARDIA)     Difficulty of Paying Living Expenses: Not hard at all   Food Insecurity: No Food Insecurity (11/8/2024)    Hunger Vital Sign     Worried About Running Out of Food in the Last Year: Never true     Ran Out of Food in the Last Year: Never true   Transportation Needs: No Transportation Needs (10/21/2024)    PRAPARE - Transportation     Lack of Transportation (Medical): No     Lack of Transportation (Non-Medical): No   Stress: Patient Declined (10/21/2024)    [employer] [place] [employer] - Occupational Stress Questionnaire     Feeling of Stress : Patient declined   Intimate Partner Violence: Not At Risk (10/21/2024)    Humiliation, Afraid, Rape, and Kick questionnaire     Fear of Current or Ex-Partner: No     Emotionally Abused: No     Physically Abused: No     Sexually Abused: No   Housing Stability:  Low Risk  (10/21/2024)    Housing Stability Vital Sign     Unable to Pay for Housing in the Last Year: No     Number of Times Moved in the Last Year: 0     Homeless in the Last Year: No     History reviewed. No pertinent past medical history.  History reviewed. No pertinent surgical history.    Charting was completed using voice recognition technology and may include unintended errors.

## 2024-12-31 LAB
ACID FAST STN SPEC: NORMAL
MYCOBACTERIUM SPEC CULT: NORMAL

## 2025-01-02 LAB — FUNGUS SPEC FUNGUS STN: NORMAL

## 2025-01-08 LAB
ACID FAST STN SPEC: NORMAL
MYCOBACTERIUM SPEC CULT: NORMAL

## 2025-01-10 LAB — FUNGUS SPEC FUNGUS STN: NORMAL

## 2025-01-15 LAB
ACID FAST STN SPEC: NORMAL
MYCOBACTERIUM SPEC CULT: NORMAL

## 2025-01-16 ENCOUNTER — APPOINTMENT (OUTPATIENT)
Dept: RADIOLOGY | Facility: HOSPITAL | Age: 50
End: 2025-01-16
Payer: COMMERCIAL

## 2025-01-22 LAB
ACID FAST STN SPEC: NORMAL
MYCOBACTERIUM SPEC CULT: NORMAL

## 2025-01-27 ENCOUNTER — APPOINTMENT (OUTPATIENT)
Dept: PRIMARY CARE | Facility: CLINIC | Age: 50
End: 2025-01-27
Payer: COMMERCIAL

## 2025-01-27 VITALS
WEIGHT: 223 LBS | HEART RATE: 66 BPM | TEMPERATURE: 98 F | BODY MASS INDEX: 33.8 KG/M2 | OXYGEN SATURATION: 98 % | HEIGHT: 68 IN | SYSTOLIC BLOOD PRESSURE: 132 MMHG | DIASTOLIC BLOOD PRESSURE: 88 MMHG

## 2025-01-27 DIAGNOSIS — Z12.11 SCREENING FOR MALIGNANT NEOPLASM OF COLON: ICD-10-CM

## 2025-01-27 DIAGNOSIS — Z00.00 VISIT FOR PREVENTIVE HEALTH EXAMINATION: Primary | ICD-10-CM

## 2025-01-27 LAB
NON-UH HIE A/G RATIO: 1.4
NON-UH HIE ALB: 4.1 G/DL (ref 3.4–5)
NON-UH HIE ALK PHOS: 53 UNIT/L (ref 45–117)
NON-UH HIE BILIRUBIN, TOTAL: 1.5 MG/DL (ref 0.3–1.2)
NON-UH HIE BUN/CREAT RATIO: 10.6
NON-UH HIE BUN: 17 MG/DL (ref 9–23)
NON-UH HIE CALCIUM: 10 MG/DL (ref 8.7–10.4)
NON-UH HIE CALCULATED LDL CHOLESTEROL: 127 MG/DL (ref 60–130)
NON-UH HIE CALCULATED OSMOLALITY: 285 MOSM/KG (ref 275–295)
NON-UH HIE CHLORIDE: 103 MMOL/L (ref 98–107)
NON-UH HIE CHOLESTEROL: 176 MG/DL (ref 100–200)
NON-UH HIE CO2, VENOUS: 32 MMOL/L (ref 20–31)
NON-UH HIE CREATININE: 1.6 MG/DL (ref 0.6–1.1)
NON-UH HIE GFR AA: 56
NON-UH HIE GLOBULIN: 2.9 G/DL
NON-UH HIE GLOMERULAR FILTRATION RATE: 46 ML/MIN/1.73M?
NON-UH HIE GLUCOSE: 103 MG/DL (ref 74–106)
NON-UH HIE GOT: 42 UNIT/L (ref 15–37)
NON-UH HIE GPT: 45 UNIT/L (ref 10–49)
NON-UH HIE HDL CHOLESTEROL: 38 MG/DL (ref 40–60)
NON-UH HIE K: 4.7 MMOL/L (ref 3.5–5.1)
NON-UH HIE NA: 142 MMOL/L (ref 135–145)
NON-UH HIE TOTAL CHOL/HDL CHOL RATIO: 4.6
NON-UH HIE TOTAL PROTEIN: 7 G/DL (ref 5.7–8.2)
NON-UH HIE TRIGLYCERIDES: 53 MG/DL (ref 30–150)
NON-UH HIE TSH: 1.05 UIU/ML (ref 0.55–4.78)

## 2025-01-27 PROCEDURE — 99396 PREV VISIT EST AGE 40-64: CPT | Performed by: FAMILY MEDICINE

## 2025-01-27 PROCEDURE — 3079F DIAST BP 80-89 MM HG: CPT | Performed by: FAMILY MEDICINE

## 2025-01-27 PROCEDURE — 3008F BODY MASS INDEX DOCD: CPT | Performed by: FAMILY MEDICINE

## 2025-01-27 PROCEDURE — 1036F TOBACCO NON-USER: CPT | Performed by: FAMILY MEDICINE

## 2025-01-27 PROCEDURE — 3075F SYST BP GE 130 - 139MM HG: CPT | Performed by: FAMILY MEDICINE

## 2025-01-27 NOTE — PROGRESS NOTES
"Subjective   Patient ID: Lul Lizama is a 49 y.o. adult who presents for Annual Exam (Pt is fasting /Declined flu shot).    Assessment/Plan     Problem List Items Addressed This Visit       Visit for preventive health examination - Primary    Relevant Orders    CT cardiac scoring wo IV contrast    Lipid Panel    Comprehensive Metabolic Panel    TSH with reflex to Free T4 if abnormal     Other Visit Diagnoses       Screening for malignant neoplasm of colon        Relevant Orders    Cologuard® colon cancer screening        CT cardiac score  Discussed about diet exercise  Discussed about age-related immunization  Discussed about fasting lab work  Follow-up every year for physical  Cologuard today  Come back early with any new signs and symptoms  Patient understood and agreed with plan.    HPI    49-year-old male here for physical  Patient is a he is watching his diet started exercising blood pressure has improved  Will hold antihypertensive  Advised patient to continue diet and exercise  Follow-up in 6 months for blood pressure check  No smoking  Has not had alcohol in a month    No new signs and symptoms  Recently had aspire removed    Sebaceous cyst on back      No Known Allergies    Current Outpatient Medications   Medication Sig Dispense Refill    cholecalciferol (Vitamin D3) 5,000 Units tablet Take 1 tablet (5,000 Units) by mouth once daily.       No current facility-administered medications for this visit.       Objective   Visit Vitals  /88 (BP Location: Left arm, Patient Position: Sitting)   Pulse 66   Temp 36.7 °C (98 °F)   Ht 1.727 m (5' 8\")   Wt 101 kg (223 lb)   SpO2 98%   BMI 33.91 kg/m²   Smoking Status Former   BSA 2.2 m²     Physical Exam  Constitutional   General appearance: Alert and in no acute distress.   Head and Face   Head and face: Normal.     Palpation of the face and sinuses: Normal.    Eyes   Inspection of eyes: Sclera and conjunctiva were normal.    Pupil exam: Pupils were equal in " size. Extraocular movements were intact.   Ears, Nose, Mouth, and Throat   Ears: Auricles: Normal.    Otoscopic examination: Tympanic membranes: Normal with no congestion and no discharge. Otic Canals: Normal without tenderness, congestion or discharge.    Hearing: Normal.     Nasal mucosa, septum, and turbinates: Normal without edema or erythema.    Lips, teeth, and gums: Normal.    Oropharynx: Normal with moist mucus membranes, no congestion. Tonsils: Normal no follicles.   Neck   Neck Exam: Appearance of the neck was normal. No neck masses observed.    Thyroid exam: Not enlarged and no palpable thyroid nodules.   Pulmonary   Respiratory assessment: No respiratory distress, normal respiratory rhythm and effort.    Auscultation of Lungs: Clear bilateral breath sounds.   Cardiovascular   Auscultation of heart: Apical pulse normal, heart rate and rhythm normal, normal S1 and S2, no murmurs and no pericardial rub.    Carotid arteries: Pulses normal with no bruits.    Exam for edema: No peripheral edema.   Chest   Chest: Normal A_P diameter, no pulsation, no intercostal withdrawing. Trachea central.   Abdomen   Abdominal Exam: No bruits, normal bowel sounds, soft, non-tender, no abdominal mass palpated.    Liver and Spleen exam: No hepato-splenomegaly.    Examination for hernias: Normal.    Lymphatic   Palpation of lymph nodes in neck: No cervical lymphadenopathy.   Musculoskeletal   Examination of gait: Normal.    Inspection of digits and nails: No clubbing or cyanosis of the fingernails.    Inspection/palpation of joints, bones and muscles: No joint swelling. Normal movement of all extremities.    Range of Motion: Normal movement of all extremities.   Skin   Skin inspection: Normal skin color and pigmentation, normal skin turgor and no visible rash.   Neurologic   Cranial nerves: Nerves 2-12 were intact, no focal neuro defects.    Reflexes: Normal.     Sensation: Normal.     Coordination: Normal.    Psychiatric    Judgment and insight: Intact.    Orientation: Oriented to person, place, and time.    Recent and remote memory: Normal.     Mood and affect: Normal.     Genitourinary Declined.    Immunization History   Administered Date(s) Administered    Tdap vaccine, age 7 year and older (BOOSTRIX, ADACEL) 07/12/2016       Review of Systems    Orders Only on 12/26/2024   Component Date Value Ref Range Status    [facility] MCV 12/26/2024 87.0  80.0 - 100.0 fL Final    [facility] MPV 12/26/2024 6.9 (L)  7.4 - 10.4 fL Final    [facility] HGB 12/26/2024 15.5  13.5 - 17.5 g/dL Final    [facility] RDW 12/26/2024 14.1  11.5 - 14.5 % Final    [facility] WBC 12/26/2024 4.7  4.5 - 11.0 x10 Final    [facility] MCH 12/26/2024 29.2  27.0 - 34.0 pg Final    [facility] HCT 12/26/2024 46.3  41.0 - 52.0 % Final    [facility] Platelet 12/26/2024 171  150 - 450 x10 Final    [facility] RBC 12/26/2024 5.32  4.70 - 6.10 x10 Final    [facility] Instr WBC ND 12/26/2024 4.7   Final    [facility] MCHC 12/26/2024 33.5  32.0 - 37.0 g/dL Final    [facility] Appearance, U 12/26/2024 Clear  Clear Final    [facility] Protein, U 12/26/2024 Negative  Negative Final    [facility] Blood, U 12/26/2024 Negative  Negative Final    [facility] Leukocyte Esterase, U 12/26/2024 Negative  Negative Final    [facility] Ketones, U 12/26/2024 Negative  Negative Final    [facility] Urobilinogen Qual, U 12/26/2024 < 2 mg/dl  < 2 mg/dl Final    [facility] Glucose Qual, U 12/26/2024 Negative  Negative Final    [facility] pH, U 12/26/2024 7.5  4.5 - 8.0 Final    [facility] Color, U 12/26/2024 Light-Yellow  Yellow Final    [facility] Specific Gravity, U 12/26/2024 1.016  1.001 - 1.035 Final    [facility] Bilirubin, U 12/26/2024 Negative  Negative Final    [facility] Nitrite, U 12/26/2024 Negative  Negative Final    [facility] U MICRO 12/26/2024 Not Indicated   Final    [facility] Uric Acid 12/26/2024 5.2  3.7 - 9.2 mg/dL Final    [facility] Glucose 12/26/2024 94  74 - 106 mg/dL  Final    [facility] Glomerular Filtration R* 12/26/2024 54  mL/min/1.73m? Final    [facility] Chloride 12/26/2024 105  98 - 107 mmol/L Final    [facility] Na 12/26/2024 142  135 - 145 mmol/L Final    [facility] BUN/Creat Ratio 12/26/2024 7.9   Final    [facility] Creatinine 12/26/2024 1.4 (H)  0.6 - 1.1 mg/dL Final    [facility] GFR AA 12/26/2024 >60   Final    [facility] CO2, venous 12/26/2024 31.0  20.0 - 31.0 mmol/L Final    [facility] Calculated Osmolality 12/26/2024 282  275 - 295 mOsm/kg Final    [facility] K 12/26/2024 4.3  3.5 - 5.1 mmol/L Final    [facility] BUN 12/26/2024 11  9 - 23 mg/dL Final    [facility] Calcium 12/26/2024 9.5  8.7 - 10.4 mg/dL Final   Office Visit on 12/06/2024   Component Date Value Ref Range Status    Tissue/Wound Culture/Smear 12/06/2024 No growth aerobically and anaerobically   Final    Gram Stain 12/06/2024 (4+) Abundant Polymorphonuclear leukocytes   Final    Gram Stain 12/06/2024 No organisms seen   Final    Extra Tube 12/06/2024 Hold for add-ons.   Final    AFB Culture 12/06/2024 Culture in progress and will be examined weekly. A result will be issued either when positive or after 8 weeks incubation.   Preliminary    AFB Stain 12/06/2024 No acid fast bacilli seen   Preliminary    Extra Tube 12/06/2024 Hold for add-ons.   Final   Office Visit on 11/20/2024   Component Date Value Ref Range Status    Tissue/Wound Culture/Smear 11/20/2024 (1+) Rare Pseudomonas aeruginosa (A)   Final    Tissue/Wound Culture/Smear 11/20/2024 (2+) Few Mixed Gram-Positive and Gram-Negative Bacteria   Final    Gram Stain 11/20/2024 (3+) Moderate Polymorphonuclear leukocytes (A)   Final    Gram Stain 11/20/2024 (1+) Rare Gram positive cocci (A)   Final    Extra Tube 11/20/2024 Hold for add-ons.   Final   Admission on 10/21/2024, Discharged on 10/22/2024   Component Date Value Ref Range Status    ABO TYPE 10/21/2024 O   Final    Rh TYPE 10/21/2024 POS   Final    ANTIBODY SCREEN 10/21/2024 NEG   Final     Fungal Smear 10/21/2024 No fungal elements seen   Final    Tissue/Wound Culture/Smear 10/21/2024 (1+) Rare Mixed Gram-Positive and Gram-Negative Bacteria   Final    Gram Stain 10/21/2024 (3+) Moderate Polymorphonuclear leukocytes   Final    Gram Stain 10/21/2024 No organisms seen   Final   Admission on 10/11/2024, Discharged on 10/17/2024   Component Date Value Ref Range Status    WBC 10/11/2024 5.0  4.4 - 11.3 x10*3/uL Final    nRBC 10/11/2024 0.0  0.0 - 0.0 /100 WBCs Final    RBC 10/11/2024 5.17  4.00 - 5.90 x10*6/uL Final    Hemoglobin 10/11/2024 15.3  12.0 - 17.5 g/dL Final    Hematocrit 10/11/2024 44.9  36.0 - 52.0 % Final    MCV 10/11/2024 87  80 - 100 fL Final    MCH 10/11/2024 29.6  26.0 - 34.0 pg Final    MCHC 10/11/2024 34.1  32.0 - 36.0 g/dL Final    RDW 10/11/2024 12.4  11.5 - 14.5 % Final    Platelets 10/11/2024 165  150 - 450 x10*3/uL Final    Protime 10/11/2024 11.4  9.8 - 12.8 seconds Final    INR 10/11/2024 1.0  0.9 - 1.1 Final    aPTT 10/11/2024 32  27 - 38 seconds Final    Glucose 10/11/2024 120 (H)  74 - 99 mg/dL Final    Sodium 10/11/2024 139  136 - 145 mmol/L Final    Potassium 10/11/2024 3.7  3.5 - 5.3 mmol/L Final    Chloride 10/11/2024 104  98 - 107 mmol/L Final    Bicarbonate 10/11/2024 26  21 - 32 mmol/L Final    Anion Gap 10/11/2024 13  10 - 20 mmol/L Final    Urea Nitrogen 10/11/2024 29 (H)  6 - 23 mg/dL Final    Creatinine 10/11/2024 1.30  0.50 - 1.30 mg/dL Final    eGFR 10/11/2024 51 (L)  >60 mL/min/1.73m*2 Final    Calcium 10/11/2024 9.2  8.6 - 10.6 mg/dL Final    Magnesium 10/11/2024 2.11  1.60 - 2.40 mg/dL Final    ABO TYPE 10/11/2024 O   Final    Rh TYPE 10/11/2024 POS   Final    ANTIBODY SCREEN 10/11/2024 NEG   Final    Vancomycin 10/13/2024 25.9 (H)  5.0 - 20.0 ug/mL Final    Tissue/Wound Culture/Smear 10/13/2024 (4+) Abundant Mixed Anaerobic Bacteria   Final    Beta Lactamase (Cefinase) 10/13/2024 Positive   Final    Gram Stain 10/13/2024 (4+) Abundant Polymorphonuclear  leukocytes (A)   Final    Gram Stain 10/13/2024 (3+) Moderate Gram positive cocci (A)   Final    WBC 10/14/2024 6.6  4.4 - 11.3 x10*3/uL Final    nRBC 10/14/2024 0.0  0.0 - 0.0 /100 WBCs Final    RBC 10/14/2024 4.68  4.00 - 5.90 x10*6/uL Final    Hemoglobin 10/14/2024 14.5  12.0 - 17.5 g/dL Final    Hematocrit 10/14/2024 41.5  36.0 - 52.0 % Final    MCV 10/14/2024 89  80 - 100 fL Final    MCH 10/14/2024 31.0  26.0 - 34.0 pg Final    MCHC 10/14/2024 34.9  32.0 - 36.0 g/dL Final    RDW 10/14/2024 12.6  11.5 - 14.5 % Final    Platelets 10/14/2024 160  150 - 450 x10*3/uL Final    Glucose 10/15/2024 116 (H)  74 - 99 mg/dL Final    Sodium 10/15/2024 139  136 - 145 mmol/L Final    Potassium 10/15/2024 4.1  3.5 - 5.3 mmol/L Final    Chloride 10/15/2024 102  98 - 107 mmol/L Final    Bicarbonate 10/15/2024 31  21 - 32 mmol/L Final    Anion Gap 10/15/2024 10  10 - 20 mmol/L Final    Urea Nitrogen 10/15/2024 13  6 - 23 mg/dL Final    Creatinine 10/15/2024 1.77 (H)  0.50 - 1.30 mg/dL Final    eGFR 10/15/2024 47 (L)  >60 mL/min/1.73m*2 Final    Calcium 10/15/2024 9.2  8.6 - 10.6 mg/dL Final    Phosphorus 10/15/2024 3.7  2.5 - 4.9 mg/dL Final    Albumin 10/15/2024 3.9  3.4 - 5.0 g/dL Final    WBC 10/15/2024 5.4  4.4 - 11.3 x10*3/uL Final    nRBC 10/15/2024 0.0  0.0 - 0.0 /100 WBCs Final    RBC 10/15/2024 4.46  4.00 - 5.90 x10*6/uL Final    Hemoglobin 10/15/2024 13.9  12.0 - 17.5 g/dL Final    Hematocrit 10/15/2024 40.3  36.0 - 52.0 % Final    MCV 10/15/2024 90  80 - 100 fL Final    MCH 10/15/2024 31.2  26.0 - 34.0 pg Final    MCHC 10/15/2024 34.5  32.0 - 36.0 g/dL Final    RDW 10/15/2024 12.1  11.5 - 14.5 % Final    Platelets 10/15/2024 145 (L)  150 - 450 x10*3/uL Final    Magnesium 10/15/2024 2.02  1.60 - 2.40 mg/dL Final    Glucose 10/16/2024 94  74 - 99 mg/dL Final    Sodium 10/16/2024 139  136 - 145 mmol/L Final    Potassium 10/16/2024 3.9  3.5 - 5.3 mmol/L Final    Chloride 10/16/2024 103  98 - 107 mmol/L Final    Bicarbonate  10/16/2024 27  21 - 32 mmol/L Final    Anion Gap 10/16/2024 13  10 - 20 mmol/L Final    Urea Nitrogen 10/16/2024 15  6 - 23 mg/dL Final    Creatinine 10/16/2024 1.59 (H)  0.50 - 1.30 mg/dL Final    eGFR 10/16/2024 53 (L)  >60 mL/min/1.73m*2 Final    Calcium 10/16/2024 9.1  8.6 - 10.6 mg/dL Final    Phosphorus 10/16/2024 3.6  2.5 - 4.9 mg/dL Final    Albumin 10/16/2024 4.0  3.4 - 5.0 g/dL Final    WBC 10/16/2024 6.0  4.4 - 11.3 x10*3/uL Final    nRBC 10/16/2024 0.0  0.0 - 0.0 /100 WBCs Final    RBC 10/16/2024 4.64  4.00 - 5.90 x10*6/uL Final    Hemoglobin 10/16/2024 14.5  12.0 - 17.5 g/dL Final    Hematocrit 10/16/2024 41.7  36.0 - 52.0 % Final    MCV 10/16/2024 90  80 - 100 fL Final    MCH 10/16/2024 31.3  26.0 - 34.0 pg Final    MCHC 10/16/2024 34.8  32.0 - 36.0 g/dL Final    RDW 10/16/2024 12.1  11.5 - 14.5 % Final    Platelets 10/16/2024 167  150 - 450 x10*3/uL Final    Magnesium 10/16/2024 2.04  1.60 - 2.40 mg/dL Final    Vancomycin 10/16/2024 11.8  5.0 - 20.0 ug/mL Final    Glucose 10/17/2024 112 (H)  74 - 99 mg/dL Final    Sodium 10/17/2024 140  136 - 145 mmol/L Final    Potassium 10/17/2024 3.8  3.5 - 5.3 mmol/L Final    Chloride 10/17/2024 103  98 - 107 mmol/L Final    Bicarbonate 10/17/2024 31  21 - 32 mmol/L Final    Anion Gap 10/17/2024 10  10 - 20 mmol/L Final    Urea Nitrogen 10/17/2024 18  6 - 23 mg/dL Final    Creatinine 10/17/2024 1.79 (H)  0.50 - 1.30 mg/dL Final    eGFR 10/17/2024 46 (L)  >60 mL/min/1.73m*2 Final    Calcium 10/17/2024 9.1  8.6 - 10.6 mg/dL Final    Phosphorus 10/17/2024 4.3  2.5 - 4.9 mg/dL Final    Albumin 10/17/2024 4.0  3.4 - 5.0 g/dL Final    WBC 10/17/2024 4.8  4.4 - 11.3 x10*3/uL Final    nRBC 10/17/2024 0.0  0.0 - 0.0 /100 WBCs Final    RBC 10/17/2024 4.57  4.00 - 5.90 x10*6/uL Final    Hemoglobin 10/17/2024 14.5  12.0 - 17.5 g/dL Final    Hematocrit 10/17/2024 42.1  36.0 - 52.0 % Final    MCV 10/17/2024 92  80 - 100 fL Final    MCH 10/17/2024 31.7  26.0 - 34.0 pg Final     MCHC 10/17/2024 34.4  32.0 - 36.0 g/dL Final    RDW 10/17/2024 12.2  11.5 - 14.5 % Final    Platelets 10/17/2024 154  150 - 450 x10*3/uL Final    Magnesium 10/17/2024 2.11  1.60 - 2.40 mg/dL Final    Vancomycin 10/17/2024 18.1  5.0 - 20.0 ug/mL Final    Tissue/Wound Culture/Smear 10/17/2024 No growth aerobically and anaerobically   Final    Gram Stain 10/17/2024 No polymorphonuclear leukocytes seen   Final    Gram Stain 10/17/2024 No organisms seen   Final       Radiology: Reviewed imaging in powerchart.  Lower extremity venous duplex left    Result Date: 12/11/2024  Interpreted By:  Otto Lagunas, STUDY: [facility] LOWER EXTREMITY VENOUS DUPLEX LEFT;  12/11/2024 3:56 pm   INDICATION: Signs/Symptoms:Leg pain and cramping.   COMPARISON: None.   ACCESSION NUMBER(S): [id number]   ORDERING CLINICIAN: JOSE OTTO   TECHNIQUE: Ultrasound of the left lower extremity deep venous system was performed from the inguinal ligament into the  calf using gray scale imaging, spectral doppler, and color doppler. Vessels imaged include: common femoral vein, proximal deep femoral vein, proximal greater saphenous vein,  proximal, mid, and distal  superficial femoral vein, popliteal vein, and the posterior tibial and peroneal veins in the calf.   The right CFV was also imaged.   FINDINGS: Respiratory variation, flow, augmentation, and compressibility are preserved throughout.  There is no sonographic evidence of deep vein thrombosis in this exam.   Imaging of the right common femoral vein was unremarkable.       No sonographic evidence of left lower extremity deep vein thrombosis.   MACRO: None   Signed by: Otto Lagunas 12/11/2024 4:12 PM Dictation workstation:   [id number]      No family history on file.  Social History     Socioeconomic History    Marital status:    Tobacco Use    Smoking status: Former     Current packs/day: 0.50     Average packs/day: 0.5 packs/day for 10.0 years (5.0 ttl pk-yrs)     Types: Cigarettes     Smokeless tobacco: Former     Types: Chew   Substance and Sexual Activity    Alcohol use: Not Currently    Drug use: Never     Social Drivers of Health     Financial Resource Strain: Low Risk  (10/21/2024)    Overall Financial Resource Strain (CARDIA)     Difficulty of Paying Living Expenses: Not hard at all   Food Insecurity: No Food Insecurity (11/8/2024)    Hunger Vital Sign     Worried About Running Out of Food in the Last Year: Never true     Ran Out of Food in the Last Year: Never true   Transportation Needs: No Transportation Needs (10/21/2024)    PRAPARE - Transportation     Lack of Transportation (Medical): No     Lack of Transportation (Non-Medical): No   Stress: Patient Declined (10/21/2024)    [employer] - Occupational Stress Questionnaire     Feeling of Stress : Patient declined   Intimate Partner Violence: Not At Risk (10/21/2024)    Humiliation, Afraid, Rape, and Kick questionnaire     Fear of Current or Ex-Partner: No     Emotionally Abused: No     Physically Abused: No     Sexually Abused: No   Housing Stability: Low Risk  (10/21/2024)    Housing Stability Vital Sign     Unable to Pay for Housing in the Last Year: No     Number of Times Moved in the Last Year: 0     Homeless in the Last Year: No     History reviewed. No pertinent past medical history.  History reviewed. No pertinent surgical history.    Charting was completed using voice recognition technology and may include unintended errors.

## 2025-01-29 LAB
ACID FAST STN SPEC: NORMAL
MYCOBACTERIUM SPEC CULT: NORMAL

## 2025-01-31 ENCOUNTER — TELEPHONE (OUTPATIENT)
Dept: PRIMARY CARE | Facility: CLINIC | Age: 50
End: 2025-01-31
Payer: COMMERCIAL

## 2025-01-31 NOTE — TELEPHONE ENCOUNTER
----- Message from Obie Thompson sent at 1/31/2025 11:13 AM EST -----  Schedule visit renal function is abnormal needs more work up and needs to start medication.

## 2025-02-06 ENCOUNTER — APPOINTMENT (OUTPATIENT)
Dept: PRIMARY CARE | Facility: CLINIC | Age: 50
End: 2025-02-06
Payer: COMMERCIAL

## 2025-02-06 ENCOUNTER — OFFICE VISIT (OUTPATIENT)
Dept: PRIMARY CARE | Facility: CLINIC | Age: 50
End: 2025-02-06
Payer: COMMERCIAL

## 2025-02-06 VITALS
BODY MASS INDEX: 33.34 KG/M2 | DIASTOLIC BLOOD PRESSURE: 90 MMHG | HEIGHT: 68 IN | OXYGEN SATURATION: 98 % | TEMPERATURE: 97.6 F | WEIGHT: 220 LBS | HEART RATE: 74 BPM | SYSTOLIC BLOOD PRESSURE: 140 MMHG

## 2025-02-06 DIAGNOSIS — I10 BENIGN ESSENTIAL HYPERTENSION: Primary | ICD-10-CM

## 2025-02-06 PROCEDURE — 1036F TOBACCO NON-USER: CPT | Performed by: FAMILY MEDICINE

## 2025-02-06 PROCEDURE — 3077F SYST BP >= 140 MM HG: CPT | Performed by: FAMILY MEDICINE

## 2025-02-06 PROCEDURE — 99213 OFFICE O/P EST LOW 20 MIN: CPT | Performed by: FAMILY MEDICINE

## 2025-02-06 PROCEDURE — 3080F DIAST BP >= 90 MM HG: CPT | Performed by: FAMILY MEDICINE

## 2025-02-06 PROCEDURE — 3008F BODY MASS INDEX DOCD: CPT | Performed by: FAMILY MEDICINE

## 2025-02-06 NOTE — PROGRESS NOTES
"Subjective   Patient ID: Lul Lizama is a 49 y.o. adult who presents for Results.    Assessment/Plan     Problem List Items Addressed This Visit    None    CT cardiac score  Discussed about diet exercise  Discussed about age-related immunization  Discussed about fasting lab work  Follow-up every year for physical  Cologuard today  Come back early with any new signs and symptoms  Patient understood and agreed with plan.    HPI    49-year-old male here for   Follow-up on abnormal lab work showed elevated creatinine probably with acute versus chronic kidney disease stage III    Previously had abnormal renal function secondary to nephrolithiasis    Currently patient is on high-protein diet with red meat  Advised patient to avoid red meat consider more plant-based diet      Does not want to start any antihypertensive at this point  Patient states he will watch his blood pressure  Will follow-up in 3 months  Send needs BMP and urine check before that  Patient is he was also using testosterone he recently stopped it      Previous visit  Advised patient to continue diet and exercise  Follow-up in 6 months for blood pressure check  No smoking  Has not had alcohol in a month    No new signs and symptoms  Recently had aspire removed    Sebaceous cyst on back      No Known Allergies    Current Outpatient Medications   Medication Sig Dispense Refill    cholecalciferol (Vitamin D3) 5,000 Units tablet Take 1 tablet (5,000 Units) by mouth once daily.       No current facility-administered medications for this visit.       Objective   Visit Vitals  /90 (BP Location: Left arm, Patient Position: Sitting)   Pulse 74   Temp 36.4 °C (97.6 °F)   Ht 1.727 m (5' 8\")   Wt 99.8 kg (220 lb)   SpO2 98%   BMI 33.45 kg/m²   Smoking Status Former   BSA 2.19 m²     Physical Exam  Constitutional:       General: He is not in acute distress.     Appearance: Normal appearance.   HENT:      Head: Normocephalic and atraumatic.      Nose: Nose " normal.   Eyes:      Extraocular Movements: Extraocular movements intact.      Conjunctiva/sclera: Conjunctivae normal.   Cardiovascular:      Rate and Rhythm: Normal rate and regular rhythm.   Pulmonary:      Effort: Pulmonary effort is normal.      Breath sounds: Normal breath sounds.   Skin:     General: Skin is warm.   Neurological:      Mental Status: He is alert and oriented to person, place, and time.   Psychiatric:         Mood and Affect: Mood normal.         Behavior: Behavior normal.   Genitourinary Declined.    Immunization History   Administered Date(s) Administered    Tdap vaccine, age 7 year and older (BOOSTRIX, ADACEL) 07/12/2016       Review of Systems    Orders Only on 01/27/2025   Component Date Value Ref Range Status    [facility] Calcium 01/27/2025 10.0  8.7 - 10.4 mg/dL Final    [facility] Total Protein 01/27/2025 7.0  5.7 - 8.2 g/dL Final    [facility] CO2, venous 01/27/2025 32.0 (H)  20.0 - 31.0 mmol/L Final    [facility] A/G Ratio 01/27/2025 1.4   Final    [facility] BUN/Creat Ratio 01/27/2025 10.6   Final    [facility] K 01/27/2025 4.7  3.5 - 5.1 mmol/L Final    [facility] GOT 01/27/2025 42 (H)  15 - 37 unit/L Final    [facility] Glucose 01/27/2025 103  74 - 106 mg/dL Final    [facility] Glomerular Filtration R* 01/27/2025 46  mL/min/1.73m? Final    [facility] BUN 01/27/2025 17  9 - 23 mg/dL Final    [facility] Calculated Osmolality 01/27/2025 285  275 - 295 mOsm/kg Final    [facility] GPT 01/27/2025 45  10 - 49 unit/L Final    [facility] Alk Phos 01/27/2025 53  45 - 117 unit/L Final    [facility] Creatinine 01/27/2025 1.6 (H)  0.6 - 1.1 mg/dL Final    [facility] GFR AA 01/27/2025 56   Final    [facility] Na 01/27/2025 142  135 - 145 mmol/L Final    [facility] Bilirubin, Total 01/27/2025 1.50 (H)  0.30 - 1.20 mg/dL Final    [facility] Chloride 01/27/2025 103  98 - 107 mmol/L Final    [facility] Globulin 01/27/2025 2.9  g/dL Final    [facility] ALB 01/27/2025 4.1  3.4 - 5.0 g/dL Final     [facility] Triglycerides 01/27/2025 53  30 - 150 mg/dL Final    [facility] HDL Cholesterol 01/27/2025 38 (L)  40 - 60 mg/dL Final    [facility] Calculated LDL [place]* 01/27/2025 127  60 - 130 mg/dL Final    [facility] Total Chol/HDL Chol Rat* 01/27/2025 4.6   Final    [facility] Cholesterol 01/27/2025 176  100 - 200 mg/dL Final    [facility] TSH 01/27/2025 1.05  0.55 - 4.78 uIU/ml Final   Orders Only on 12/26/2024   Component Date Value Ref Range Status    [facility] MCV 12/26/2024 87.0  80.0 - 100.0 fL Final    [facility] MPV 12/26/2024 6.9 (L)  7.4 - 10.4 fL Final    [facility] HGB 12/26/2024 15.5  13.5 - 17.5 g/dL Final    [facility] RDW 12/26/2024 14.1  11.5 - 14.5 % Final    [facility] WBC 12/26/2024 4.7  4.5 - 11.0 x10 Final    [facility] MCH 12/26/2024 29.2  27.0 - 34.0 pg Final    [facility] HCT 12/26/2024 46.3  41.0 - 52.0 % Final    [facility] Platelet 12/26/2024 171  150 - 450 x10 Final    [facility] RBC 12/26/2024 5.32  4.70 - 6.10 x10 Final    [facility] Instr WBC ND 12/26/2024 4.7   Final    [facility] MCHC 12/26/2024 33.5  32.0 - 37.0 g/dL Final    [facility] Appearance, U 12/26/2024 Clear  Clear Final    [facility] Protein, U 12/26/2024 Negative  Negative Final    [facility] Blood, U 12/26/2024 Negative  Negative Final    [facility] Leukocyte Esterase, U 12/26/2024 Negative  Negative Final    [facility] Ketones, U 12/26/2024 Negative  Negative Final    [facility] Urobilinogen Qual, U 12/26/2024 < 2 mg/dl  < 2 mg/dl Final    [facility] Glucose Qual, U 12/26/2024 Negative  Negative Final    [facility] pH, U 12/26/2024 7.5  4.5 - 8.0 Final    [facility] Color, U 12/26/2024 Light-Yellow  Yellow Final    [facility] Specific Gravity, U 12/26/2024 1.016  1.001 - 1.035 Final    [facility] Bilirubin, U 12/26/2024 Negative  Negative Final    [facility] Nitrite, U 12/26/2024 Negative  Negative Final    [facility] U MICRO 12/26/2024 Not Indicated   Final    [facility] Uric Acid 12/26/2024 5.2  3.7 - 9.2 mg/dL  Final    [facility] Glucose 12/26/2024 94  74 - 106 mg/dL Final    [facility] Glomerular Filtration R* 12/26/2024 54  mL/min/1.73m? Final    [facility] Chloride 12/26/2024 105  98 - 107 mmol/L Final    [facility] Na 12/26/2024 142  135 - 145 mmol/L Final    [facility] BUN/Creat Ratio 12/26/2024 7.9   Final    [facility] Creatinine 12/26/2024 1.4 (H)  0.6 - 1.1 mg/dL Final    [facility] GFR AA 12/26/2024 >60   Final    [facility] CO2, venous 12/26/2024 31.0  20.0 - 31.0 mmol/L Final    [facility] Calculated Osmolality 12/26/2024 282  275 - 295 mOsm/kg Final    [facility] K 12/26/2024 4.3  3.5 - 5.1 mmol/L Final    [facility] BUN 12/26/2024 11  9 - 23 mg/dL Final    [facility] Calcium 12/26/2024 9.5  8.7 - 10.4 mg/dL Final   Office Visit on 12/06/2024   Component Date Value Ref Range Status    Tissue/Wound Culture/Smear 12/06/2024 No growth aerobically and anaerobically   Final    Gram Stain 12/06/2024 (4+) Abundant Polymorphonuclear leukocytes   Final    Gram Stain 12/06/2024 No organisms seen   Final    Extra Tube 12/06/2024 Hold for add-ons.   Final    AFB Culture 12/06/2024 No Mycobacteria isolated.   Final    AFB Stain 12/06/2024 No acid fast bacilli seen   Final    Extra Tube 12/06/2024 Hold for add-ons.   Final   Office Visit on 11/20/2024   Component Date Value Ref Range Status    Tissue/Wound Culture/Smear 11/20/2024 (1+) Rare Pseudomonas aeruginosa (A)   Final    Tissue/Wound Culture/Smear 11/20/2024 (2+) Few Mixed Gram-Positive and Gram-Negative Bacteria   Final    Gram Stain 11/20/2024 (3+) Moderate Polymorphonuclear leukocytes (A)   Final    Gram Stain 11/20/2024 (1+) Rare Gram positive cocci (A)   Final    Extra Tube 11/20/2024 Hold for add-ons.   Final   Admission on 10/21/2024, Discharged on 10/22/2024   Component Date Value Ref Range Status    ABO TYPE 10/21/2024 O   Final    Rh TYPE 10/21/2024 POS   Final    ANTIBODY SCREEN 10/21/2024 NEG   Final    Fungal Smear 10/21/2024 No fungal elements seen    Final    Tissue/Wound Culture/Smear 10/21/2024 (1+) Rare Mixed Gram-Positive and Gram-Negative Bacteria   Final    Gram Stain 10/21/2024 (3+) Moderate Polymorphonuclear leukocytes   Final    Gram Stain 10/21/2024 No organisms seen   Final   Admission on 10/11/2024, Discharged on 10/17/2024   Component Date Value Ref Range Status    WBC 10/11/2024 5.0  4.4 - 11.3 x10*3/uL Final    nRBC 10/11/2024 0.0  0.0 - 0.0 /100 WBCs Final    RBC 10/11/2024 5.17  4.00 - 5.90 x10*6/uL Final    Hemoglobin 10/11/2024 15.3  12.0 - 17.5 g/dL Final    Hematocrit 10/11/2024 44.9  36.0 - 52.0 % Final    MCV 10/11/2024 87  80 - 100 fL Final    MCH 10/11/2024 29.6  26.0 - 34.0 pg Final    MCHC 10/11/2024 34.1  32.0 - 36.0 g/dL Final    RDW 10/11/2024 12.4  11.5 - 14.5 % Final    Platelets 10/11/2024 165  150 - 450 x10*3/uL Final    Protime 10/11/2024 11.4  9.8 - 12.8 seconds Final    INR 10/11/2024 1.0  0.9 - 1.1 Final    aPTT 10/11/2024 32  27 - 38 seconds Final    Glucose 10/11/2024 120 (H)  74 - 99 mg/dL Final    Sodium 10/11/2024 139  136 - 145 mmol/L Final    Potassium 10/11/2024 3.7  3.5 - 5.3 mmol/L Final    Chloride 10/11/2024 104  98 - 107 mmol/L Final    Bicarbonate 10/11/2024 26  21 - 32 mmol/L Final    Anion Gap 10/11/2024 13  10 - 20 mmol/L Final    Urea Nitrogen 10/11/2024 29 (H)  6 - 23 mg/dL Final    Creatinine 10/11/2024 1.30  0.50 - 1.30 mg/dL Final    eGFR 10/11/2024 51 (L)  >60 mL/min/1.73m*2 Final    Calcium 10/11/2024 9.2  8.6 - 10.6 mg/dL Final    Magnesium 10/11/2024 2.11  1.60 - 2.40 mg/dL Final    ABO TYPE 10/11/2024 O   Final    Rh TYPE 10/11/2024 POS   Final    ANTIBODY SCREEN 10/11/2024 NEG   Final    Vancomycin 10/13/2024 25.9 (H)  5.0 - 20.0 ug/mL Final    Tissue/Wound Culture/Smear 10/13/2024 (4+) Abundant Mixed Anaerobic Bacteria   Final    Beta Lactamase (Cefinase) 10/13/2024 Positive   Final    Gram Stain 10/13/2024 (4+) Abundant Polymorphonuclear leukocytes (A)   Final    Gram Stain 10/13/2024 (3+)  Moderate Gram positive cocci (A)   Final    WBC 10/14/2024 6.6  4.4 - 11.3 x10*3/uL Final    nRBC 10/14/2024 0.0  0.0 - 0.0 /100 WBCs Final    RBC 10/14/2024 4.68  4.00 - 5.90 x10*6/uL Final    Hemoglobin 10/14/2024 14.5  12.0 - 17.5 g/dL Final    Hematocrit 10/14/2024 41.5  36.0 - 52.0 % Final    MCV 10/14/2024 89  80 - 100 fL Final    MCH 10/14/2024 31.0  26.0 - 34.0 pg Final    MCHC 10/14/2024 34.9  32.0 - 36.0 g/dL Final    RDW 10/14/2024 12.6  11.5 - 14.5 % Final    Platelets 10/14/2024 160  150 - 450 x10*3/uL Final    Glucose 10/15/2024 116 (H)  74 - 99 mg/dL Final    Sodium 10/15/2024 139  136 - 145 mmol/L Final    Potassium 10/15/2024 4.1  3.5 - 5.3 mmol/L Final    Chloride 10/15/2024 102  98 - 107 mmol/L Final    Bicarbonate 10/15/2024 31  21 - 32 mmol/L Final    Anion Gap 10/15/2024 10  10 - 20 mmol/L Final    Urea Nitrogen 10/15/2024 13  6 - 23 mg/dL Final    Creatinine 10/15/2024 1.77 (H)  0.50 - 1.30 mg/dL Final    eGFR 10/15/2024 47 (L)  >60 mL/min/1.73m*2 Final    Calcium 10/15/2024 9.2  8.6 - 10.6 mg/dL Final    Phosphorus 10/15/2024 3.7  2.5 - 4.9 mg/dL Final    Albumin 10/15/2024 3.9  3.4 - 5.0 g/dL Final    WBC 10/15/2024 5.4  4.4 - 11.3 x10*3/uL Final    nRBC 10/15/2024 0.0  0.0 - 0.0 /100 WBCs Final    RBC 10/15/2024 4.46  4.00 - 5.90 x10*6/uL Final    Hemoglobin 10/15/2024 13.9  12.0 - 17.5 g/dL Final    Hematocrit 10/15/2024 40.3  36.0 - 52.0 % Final    MCV 10/15/2024 90  80 - 100 fL Final    MCH 10/15/2024 31.2  26.0 - 34.0 pg Final    MCHC 10/15/2024 34.5  32.0 - 36.0 g/dL Final    RDW 10/15/2024 12.1  11.5 - 14.5 % Final    Platelets 10/15/2024 145 (L)  150 - 450 x10*3/uL Final    Magnesium 10/15/2024 2.02  1.60 - 2.40 mg/dL Final    Glucose 10/16/2024 94  74 - 99 mg/dL Final    Sodium 10/16/2024 139  136 - 145 mmol/L Final    Potassium 10/16/2024 3.9  3.5 - 5.3 mmol/L Final    Chloride 10/16/2024 103  98 - 107 mmol/L Final    Bicarbonate 10/16/2024 27  21 - 32 mmol/L Final    Anion Gap  10/16/2024 13  10 - 20 mmol/L Final    Urea Nitrogen 10/16/2024 15  6 - 23 mg/dL Final    Creatinine 10/16/2024 1.59 (H)  0.50 - 1.30 mg/dL Final    eGFR 10/16/2024 53 (L)  >60 mL/min/1.73m*2 Final    Calcium 10/16/2024 9.1  8.6 - 10.6 mg/dL Final    Phosphorus 10/16/2024 3.6  2.5 - 4.9 mg/dL Final    Albumin 10/16/2024 4.0  3.4 - 5.0 g/dL Final    WBC 10/16/2024 6.0  4.4 - 11.3 x10*3/uL Final    nRBC 10/16/2024 0.0  0.0 - 0.0 /100 WBCs Final    RBC 10/16/2024 4.64  4.00 - 5.90 x10*6/uL Final    Hemoglobin 10/16/2024 14.5  12.0 - 17.5 g/dL Final    Hematocrit 10/16/2024 41.7  36.0 - 52.0 % Final    MCV 10/16/2024 90  80 - 100 fL Final    MCH 10/16/2024 31.3  26.0 - 34.0 pg Final    MCHC 10/16/2024 34.8  32.0 - 36.0 g/dL Final    RDW 10/16/2024 12.1  11.5 - 14.5 % Final    Platelets 10/16/2024 167  150 - 450 x10*3/uL Final    Magnesium 10/16/2024 2.04  1.60 - 2.40 mg/dL Final    Vancomycin 10/16/2024 11.8  5.0 - 20.0 ug/mL Final    Glucose 10/17/2024 112 (H)  74 - 99 mg/dL Final    Sodium 10/17/2024 140  136 - 145 mmol/L Final    Potassium 10/17/2024 3.8  3.5 - 5.3 mmol/L Final    Chloride 10/17/2024 103  98 - 107 mmol/L Final    Bicarbonate 10/17/2024 31  21 - 32 mmol/L Final    Anion Gap 10/17/2024 10  10 - 20 mmol/L Final    Urea Nitrogen 10/17/2024 18  6 - 23 mg/dL Final    Creatinine 10/17/2024 1.79 (H)  0.50 - 1.30 mg/dL Final    eGFR 10/17/2024 46 (L)  >60 mL/min/1.73m*2 Final    Calcium 10/17/2024 9.1  8.6 - 10.6 mg/dL Final    Phosphorus 10/17/2024 4.3  2.5 - 4.9 mg/dL Final    Albumin 10/17/2024 4.0  3.4 - 5.0 g/dL Final    WBC 10/17/2024 4.8  4.4 - 11.3 x10*3/uL Final    nRBC 10/17/2024 0.0  0.0 - 0.0 /100 WBCs Final    RBC 10/17/2024 4.57  4.00 - 5.90 x10*6/uL Final    Hemoglobin 10/17/2024 14.5  12.0 - 17.5 g/dL Final    Hematocrit 10/17/2024 42.1  36.0 - 52.0 % Final    MCV 10/17/2024 92  80 - 100 fL Final    MCH 10/17/2024 31.7  26.0 - 34.0 pg Final    MCHC 10/17/2024 34.4  32.0 - 36.0 g/dL Final     RDW 10/17/2024 12.2  11.5 - 14.5 % Final    Platelets 10/17/2024 154  150 - 450 x10*3/uL Final    Magnesium 10/17/2024 2.11  1.60 - 2.40 mg/dL Final    Vancomycin 10/17/2024 18.1  5.0 - 20.0 ug/mL Final    Tissue/Wound Culture/Smear 10/17/2024 No growth aerobically and anaerobically   Final    Gram Stain 10/17/2024 No polymorphonuclear leukocytes seen   Final    Gram Stain 10/17/2024 No organisms seen   Final       Radiology: Reviewed imaging in powerchart.  Lower extremity venous duplex left    Result Date: 12/11/2024  Interpreted By:  Otto Lagunas, STUDY: [facility] LOWER EXTREMITY VENOUS DUPLEX LEFT;  12/11/2024 3:56 pm   INDICATION: Signs/Symptoms:Leg pain and cramping.   COMPARISON: None.   ACCESSION NUMBER(S): [id number]   ORDERING CLINICIAN: JOSE OTTO   TECHNIQUE: Ultrasound of the left lower extremity deep venous system was performed from the inguinal ligament into the  calf using gray scale imaging, spectral doppler, and color doppler. Vessels imaged include: common femoral vein, proximal deep femoral vein, proximal greater saphenous vein,  proximal, mid, and distal  superficial femoral vein, popliteal vein, and the posterior tibial and peroneal veins in the calf.   The right CFV was also imaged.   FINDINGS: Respiratory variation, flow, augmentation, and compressibility are preserved throughout.  There is no sonographic evidence of deep vein thrombosis in this exam.   Imaging of the right common femoral vein was unremarkable.       No sonographic evidence of left lower extremity deep vein thrombosis.   MACRO: None   Signed by: Otto Lagunas 12/11/2024 4:12 PM Dictation workstation:   [id number]      No family history on file.  Social History     Socioeconomic History    Marital status:    Tobacco Use    Smoking status: Former     Current packs/day: 0.50     Average packs/day: 0.5 packs/day for 10.0 years (5.0 ttl pk-yrs)     Types: Cigarettes    Smokeless tobacco: Former     Types: Chew    Substance and Sexual Activity    Alcohol use: Not Currently    Drug use: Never     Social Drivers of Health     Financial Resource Strain: Low Risk  (10/21/2024)    Overall Financial Resource Strain (CARDIA)     Difficulty of Paying Living Expenses: Not hard at all   Food Insecurity: No Food Insecurity (11/8/2024)    Hunger Vital Sign     Worried About Running Out of Food in the Last Year: Never true     Ran Out of Food in the Last Year: Never true   Transportation Needs: No Transportation Needs (10/21/2024)    PRAPARE - Transportation     Lack of Transportation (Medical): No     Lack of Transportation (Non-Medical): No   Stress: Patient Declined (10/21/2024)    [employer] [place] - Occupational Stress Questionnaire     Feeling of Stress : Patient declined   Intimate Partner Violence: Not At Risk (10/21/2024)    Humiliation, Afraid, Rape, and Kick questionnaire     Fear of Current or Ex-Partner: No     Emotionally Abused: No     Physically Abused: No     Sexually Abused: No   Housing Stability: Low Risk  (10/21/2024)    Housing Stability Vital Sign     Unable to Pay for Housing in the Last Year: No     Number of Times Moved in the Last Year: 0     Homeless in the Last Year: No     History reviewed. No pertinent past medical history.  History reviewed. No pertinent surgical history.    Charting was completed using voice recognition technology and may include unintended errors.

## 2025-02-22 ENCOUNTER — HOSPITAL ENCOUNTER (OUTPATIENT)
Dept: RADIOLOGY | Facility: HOSPITAL | Age: 50
Discharge: HOME | End: 2025-02-22
Payer: COMMERCIAL

## 2025-02-22 DIAGNOSIS — Z00.00 VISIT FOR PREVENTIVE HEALTH EXAMINATION: ICD-10-CM

## 2025-02-22 PROCEDURE — 75571 CT HRT W/O DYE W/CA TEST: CPT

## 2025-03-05 ENCOUNTER — DOCUMENTATION (OUTPATIENT)
Dept: IMMUNOLOGY | Facility: CLINIC | Age: 50
End: 2025-03-05
Payer: COMMERCIAL

## 2025-03-05 NOTE — PROGRESS NOTES
3/5/2025 update  12/6/2024 surgical wound with minor dehiscence.  Clothing stained with some drainage.  See 12/6/2024 office note.  Wound does not appear infected.  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  12/6/2024 surgical wound culture negative for AFB.  Patient informed.  No report from patient or concerns raised about progressive infection.  Wound reportedly healed.

## 2025-03-14 ENCOUNTER — OFFICE VISIT (OUTPATIENT)
Dept: OTOLARYNGOLOGY | Facility: CLINIC | Age: 50
End: 2025-03-14
Payer: COMMERCIAL

## 2025-03-14 VITALS
RESPIRATION RATE: 16 BRPM | HEIGHT: 68 IN | BODY MASS INDEX: 33.1 KG/M2 | DIASTOLIC BLOOD PRESSURE: 85 MMHG | OXYGEN SATURATION: 98 % | TEMPERATURE: 98.7 F | HEART RATE: 85 BPM | WEIGHT: 218.38 LBS | SYSTOLIC BLOOD PRESSURE: 143 MMHG

## 2025-03-14 DIAGNOSIS — T81.49XA SURGICAL WOUND INFECTION: ICD-10-CM

## 2025-03-14 PROCEDURE — 1036F TOBACCO NON-USER: CPT

## 2025-03-14 PROCEDURE — 3079F DIAST BP 80-89 MM HG: CPT

## 2025-03-14 PROCEDURE — 3077F SYST BP >= 140 MM HG: CPT

## 2025-03-14 PROCEDURE — 3008F BODY MASS INDEX DOCD: CPT

## 2025-03-14 PROCEDURE — 99214 OFFICE O/P EST MOD 30 MIN: CPT

## 2025-03-14 ASSESSMENT — PATIENT HEALTH QUESTIONNAIRE - PHQ9
SUM OF ALL RESPONSES TO PHQ9 QUESTIONS 1 AND 2: 0
2. FEELING DOWN, DEPRESSED OR HOPELESS: NOT AT ALL
1. LITTLE INTEREST OR PLEASURE IN DOING THINGS: NOT AT ALL

## 2025-03-14 ASSESSMENT — ENCOUNTER SYMPTOMS
OCCASIONAL FEELINGS OF UNSTEADINESS: 0
DEPRESSION: 0
LOSS OF SENSATION IN FEET: 0

## 2025-03-14 ASSESSMENT — PAIN SCALES - GENERAL: PAINLEVEL_OUTOF10: 0-NO PAIN

## 2025-03-14 NOTE — PROGRESS NOTES
3/14/2025 Follow up visit    Patient presents today due to puffiness and tenderness in the incision area.  Patient reports within the last 10 days has presented with mild tenderness and some erythema in the area.    Although instructed otherwise not to squeeze area he reported he forcefully drained it yesterday.  He reports brown secretion followed by blood tainted came out.     Today exam show superficial erythema some tenderness at touch but no signs of deep infection or fluctuation.  Exam shows wound dehiscence and secretion is secretion is collected and sent for culture and fungal growth.     Case is followed closely with infectious disease and he is updated about new findings.    11/20/2024 Follow up visit for neck incision erythema and drainage    Patient reached out because he developed some erythema and tenderness on neck incision.  He reports some bloody drainage in the morning but he has not continued through the day.      Pt does not have significant symptoms  (denies fevers or shivering) and at this point neck incision does not necessarily look infected or have indication of abscess formation.     During procedure neck was naive and no infection was noted.  We did neck first to avoid cross-contamination.  Chest had necrotic tissue and purulence that was fully washed and debrided.     Today exam show superficial erythema some tenderness at touch but no signs of deep infection or fluctuation or significant lymph node changes in neck.  Exam is suggestive of suture abscess.  Secretion is collected and sent for culture.  Case is discussed with infectious disease.    Patient started on Augmentin and will do close follow-up.      Addendum 11/22/2024  Called patient today the reports significant improvement in symptoms including tenderness erythema secretion.  Patient denies any other symptoms at this time.   Infectious disease providers informed and close follow-up will be done.        11/8/2024 Post op visit  sp Exploration Of Right Neck & Chest, Explantation Of Inspire Device on 10/21/2024     Post op visit for HNS     Patient returns for Inspire explantation post-op fu.      Reports to be well and that did not need a lot of pain medications. Specifically, took opiates only on initial days. Reports no more symptoms. Fevers and malaise were better the next day of removal.       Denies fevers, nauseas or other constitutional symptoms. Patient is healing well from both incisions. No signs of infections or significant inflammation. Presents with no local induration on neck or hematoma. Chest incision is looking well. No sings of infections. Overall good healing from procedure. Patient is to reach out if any warning signs take place.

## 2025-03-16 LAB
BACTERIA SPEC AEROBE CULT: NORMAL
BACTERIA SPEC ANAEROBE CULT: NORMAL
FUNGUS SPEC CULT: NORMAL
FUNGUS SPEC FUNGUS STN: NORMAL
QUEST ACID FAST STAIN: NORMAL
QUEST AFB CULTURE: NORMAL
QUEST AFB REPORT: NORMAL
QUEST AFB SPECIAL REQUEST: NORMAL
QUEST AFB SPECIMEN DISCRIPTION: NORMAL

## 2025-03-20 LAB
BACTERIA SPEC AEROBE CULT: NORMAL
BACTERIA SPEC ANAEROBE CULT: NORMAL
FUNGUS SPEC FUNGUS STN: NORMAL
QUEST AFB SPECIMEN DISCRIPTION: NORMAL

## 2025-04-07 ENCOUNTER — TELEPHONE (OUTPATIENT)
Dept: OTOLARYNGOLOGY | Facility: CLINIC | Age: 50
End: 2025-04-07
Payer: COMMERCIAL

## 2025-04-07 NOTE — TELEPHONE ENCOUNTER
Called patient and discussed results. Patient reports chest has dried and closed by secondary intention.  Patient is also instructed to follow up with ID provider.

## 2025-05-13 ENCOUNTER — APPOINTMENT (OUTPATIENT)
Dept: PRIMARY CARE | Facility: CLINIC | Age: 50
End: 2025-05-13
Payer: COMMERCIAL

## 2025-06-05 ENCOUNTER — APPOINTMENT (OUTPATIENT)
Dept: PRIMARY CARE | Facility: CLINIC | Age: 50
End: 2025-06-05
Payer: COMMERCIAL

## 2025-09-24 ENCOUNTER — APPOINTMENT (OUTPATIENT)
Dept: PRIMARY CARE | Facility: CLINIC | Age: 50
End: 2025-09-24
Payer: COMMERCIAL

## (undated) DEVICE — BOWL, BASIN, 32 OZ, STERILE

## (undated) DEVICE — DRESSING, GAUZE, WASHED FLUFF, LARGE, STERILE

## (undated) DEVICE — NEEDLE, HYPODERMIC, 25 G X 1.5 IN, A BEVEL, STERILE

## (undated) DEVICE — Device

## (undated) DEVICE — MANIFOLD, 4 PORT NEPTUNE STANDARD

## (undated) DEVICE — DRAPE, CAMERA

## (undated) DEVICE — SYRINGE, LUER LOCK, 12ML

## (undated) DEVICE — REST, HEAD, BAGEL, 9 IN

## (undated) DEVICE — DRAPE, INCISE, ANTIMICROBIAL, IOBAN 2, LARGE, 17 X 23 IN, DISPOSABLE, STERILE

## (undated) DEVICE — SUTURE, SILK, 2-0, 30 IN, SH, BLACK

## (undated) DEVICE — CATHETER, IV, ANGIOCATH, 20 G X 1.88 IN, FEP POLYMER

## (undated) DEVICE — LOOP, VESSEL, MINI, WHITE

## (undated) DEVICE — SUTURE, VICRYL, 3-0,18 IN, SH, UNDYED

## (undated) DEVICE — CUP, SOLUTION

## (undated) DEVICE — SPONGE, DISSECTOR, PEANUT, 3/8, STERILE 5 FOAM HOLDER"

## (undated) DEVICE — SPONGE GAUZE, XRAY SC+RFID, 4X4 16 PLY, STERILE

## (undated) DEVICE — WOUND SYSTEM, DEBRIDEMENT & CLEANING, O.R DUOPAK

## (undated) DEVICE — COVER, CART, 45 X 27 X 48 IN, CLEAR